# Patient Record
Sex: FEMALE | Race: WHITE | NOT HISPANIC OR LATINO | Employment: OTHER | URBAN - METROPOLITAN AREA
[De-identification: names, ages, dates, MRNs, and addresses within clinical notes are randomized per-mention and may not be internally consistent; named-entity substitution may affect disease eponyms.]

---

## 2023-03-16 LAB
THYROTROPIN (MIU/L) IN SER/PLAS BY DETECTION LIMIT <= 0.05 MIU/L: 1.85 MIU/L (ref 0.44–3.98)
THYROXINE (T4) FREE (NG/DL) IN SER/PLAS: 1.08 NG/DL (ref 0.78–1.48)

## 2023-03-20 LAB
THYROGLOBULIN AB (IU/ML) IN SER/PLAS: <0.9 IU/ML (ref 0–4)
THYROGLOBULIN LC-MS/MS: NORMAL NG/ML (ref 1.3–31.8)
THYROGLOBULIN: 3.4 NG/ML (ref 1.3–31.8)

## 2023-04-19 ENCOUNTER — OFFICE VISIT (OUTPATIENT)
Dept: PRIMARY CARE | Facility: CLINIC | Age: 38
End: 2023-04-19
Payer: COMMERCIAL

## 2023-04-19 VITALS
BODY MASS INDEX: 28.79 KG/M2 | TEMPERATURE: 97.7 F | HEART RATE: 72 BPM | SYSTOLIC BLOOD PRESSURE: 124 MMHG | DIASTOLIC BLOOD PRESSURE: 86 MMHG | WEIGHT: 190 LBS | OXYGEN SATURATION: 96 % | RESPIRATION RATE: 16 BRPM | HEIGHT: 68 IN

## 2023-04-19 DIAGNOSIS — J01.11 ACUTE RECURRENT FRONTAL SINUSITIS: Primary | ICD-10-CM

## 2023-04-19 DIAGNOSIS — F98.8 ATTENTION DEFICIT DISORDER (ADD) WITHOUT HYPERACTIVITY: ICD-10-CM

## 2023-04-19 PROCEDURE — 99214 OFFICE O/P EST MOD 30 MIN: CPT | Performed by: NURSE PRACTITIONER

## 2023-04-19 PROCEDURE — 1036F TOBACCO NON-USER: CPT | Performed by: NURSE PRACTITIONER

## 2023-04-19 RX ORDER — FLUTICASONE PROPIONATE AND SALMETEROL XINAFOATE 45; 21 UG/1; UG/1
1 AEROSOL, METERED RESPIRATORY (INHALATION)
COMMUNITY
End: 2023-10-11 | Stop reason: ALTCHOICE

## 2023-04-19 RX ORDER — DEXTROAMPHETAMINE SACCHARATE, AMPHETAMINE ASPARTATE MONOHYDRATE, DEXTROAMPHETAMINE SULFATE AND AMPHETAMINE SULFATE 2.5; 2.5; 2.5; 2.5 MG/1; MG/1; MG/1; MG/1
10 CAPSULE, EXTENDED RELEASE ORAL EVERY MORNING
Qty: 30 CAPSULE | Refills: 0 | Status: SHIPPED | OUTPATIENT
Start: 2023-04-19 | End: 2023-05-16

## 2023-04-19 RX ORDER — MINERAL OIL
180 ENEMA (ML) RECTAL DAILY
COMMUNITY

## 2023-04-19 RX ORDER — FLUTICASONE FUROATE 27.5 UG/1
2 SPRAY, METERED NASAL
COMMUNITY

## 2023-04-19 RX ORDER — AZITHROMYCIN 250 MG/1
TABLET, FILM COATED ORAL
Qty: 6 TABLET | Refills: 0 | Status: SHIPPED | OUTPATIENT
Start: 2023-04-19 | End: 2023-04-25 | Stop reason: SDUPTHER

## 2023-04-19 ASSESSMENT — PATIENT HEALTH QUESTIONNAIRE - PHQ9
1. LITTLE INTEREST OR PLEASURE IN DOING THINGS: NOT AT ALL
2. FEELING DOWN, DEPRESSED OR HOPELESS: NOT AT ALL
SUM OF ALL RESPONSES TO PHQ9 QUESTIONS 1 AND 2: 0

## 2023-04-19 ASSESSMENT — ENCOUNTER SYMPTOMS
SORE THROAT: 0
COUGH: 1
SINUS PAIN: 1

## 2023-04-19 ASSESSMENT — PAIN SCALES - GENERAL: PAINLEVEL: 0-NO PAIN

## 2023-04-19 NOTE — ASSESSMENT & PLAN NOTE
O[facility] reviewed. No recent purchases from dispensary.  Adderall XR 10 mg ordered. Can titrate up in the future if needed.  Drug screen ordered.  Controlled substance contract signed.   Follow up every 6 months.

## 2023-04-19 NOTE — PROGRESS NOTES
"Subjective   Patient ID: Lashaun Cruz is a 37 y.o. female who presents for Sinus Problem.    ADD  She had been on Adderall XR 20 mg for years. She stopped last year during pregnancy.   She does hold a medical marijuana card but did not use any marijuana for the past year. Confirmed on [employer]. Last purchase was May 2022. She was taking this at that time for back pain which has since resolved.  She would like to get back on the adderall given her attention deficit history. She is finding it hard to focus.     URI   This is a new problem. The current episode started 1 to 4 weeks ago. The problem has been gradually worsening. There has been no fever. Associated symptoms include congestion, coughing, a plugged ear sensation and sinus pain. Pertinent negatives include no ear pain or sore throat.        Review of Systems   HENT:  Positive for congestion and sinus pain. Negative for ear pain and sore throat.    Respiratory:  Positive for cough.      otherwise negative aside from what was mentioned above in HPI.    Objective   /86 (BP Location: Right arm, Patient Position: Sitting, BP Cuff Size: Adult)   Pulse 72   Temp 36.5 °C (97.7 °F) (Temporal)   Resp 16   Ht 1.727 m (5' 8\")   Wt 86.2 kg (190 lb)   SpO2 96%   BMI 28.89 kg/m²     Physical Exam  Vitals reviewed.   Constitutional:       Appearance: Normal appearance.   HENT:      Head: Normocephalic.      Right Ear: Tympanic membrane, ear canal and external ear normal.      Left Ear: Tympanic membrane, ear canal and external ear normal.      Nose: Nose normal.      Mouth/Throat:      Mouth: Mucous membranes are moist.   Eyes:      Conjunctiva/sclera: Conjunctivae normal.      Pupils: Pupils are equal, round, and reactive to light.   Cardiovascular:      Rate and Rhythm: Normal rate and regular rhythm.      Pulses: Normal pulses.      Heart sounds: Normal heart sounds.   Pulmonary:      Effort: Pulmonary effort is normal.      Breath sounds: Normal breath " sounds.   Abdominal:      General: Abdomen is flat. Bowel sounds are normal.      Palpations: Abdomen is soft.   Musculoskeletal:         General: Normal range of motion.      Cervical back: Normal range of motion and neck supple.   Skin:     General: Skin is warm.   Neurological:      General: No focal deficit present.      Mental Status: She is alert and oriented to person, place, and time. Mental status is at baseline.   Psychiatric:         Mood and Affect: Mood normal.         Behavior: Behavior normal.         Thought Content: Thought content normal.         Judgment: Judgment normal.         Assessment/Plan   Problem List Items Addressed This Visit          Other    Attention deficit disorder (ADD) without hyperactivity (Chronic)     O[facility] reviewed. No recent purchases from dispensary.  Adderall XR 10 mg ordered. Can titrate up in the future if needed.  Drug screen ordered.  Controlled substance contract signed.   Follow up every 6 months.         Relevant Medications    amphetamine-dextroamphetamine XR (Adderall XR) 10 mg 24 hr capsule     Other Visit Diagnoses       Acute recurrent frontal sinusitis    -  Primary    Relevant Medications    azithromycin (Zithromax) 250 mg tablet    Other Relevant Orders    Drug Screen, Urine With Reflex to Confirmation          Nasal rinses, humification/hot shower until mucous drains, increase fluid intake aiming for half your body weight in oz of water daily. This will help to thin mucous secretion and replace fluids that have been lost.  Warm, moist air, nasal saline hydration, avoidance of nasal irritants including cigarette smoke.  Sip hot or warm drinks, this may soothe nasal passages  Avoid extremely cold and dry air  Tylenol/Advil for muscle aches and fever.  Warm salt water gargles, throat lozenges and popsicle's for any sore throat.    Return in 1 week if not improving.

## 2023-04-24 ENCOUNTER — PATIENT MESSAGE (OUTPATIENT)
Dept: PRIMARY CARE | Facility: CLINIC | Age: 38
End: 2023-04-24

## 2023-04-24 DIAGNOSIS — J01.11 ACUTE RECURRENT FRONTAL SINUSITIS: ICD-10-CM

## 2023-04-25 RX ORDER — AZITHROMYCIN 250 MG/1
TABLET, FILM COATED ORAL
Qty: 6 TABLET | Refills: 0 | Status: SHIPPED | OUTPATIENT
Start: 2023-04-25 | End: 2023-04-30

## 2023-05-16 DIAGNOSIS — F90.0 ADHD, PREDOMINANTLY INATTENTIVE TYPE: Primary | ICD-10-CM

## 2023-05-16 PROBLEM — E05.90 SUBCLINICAL HYPERTHYROIDISM: Status: ACTIVE | Noted: 2023-05-16

## 2023-05-16 PROBLEM — B37.9 YEAST INFECTION: Status: ACTIVE | Noted: 2023-05-16

## 2023-05-16 PROBLEM — M25.512 LEFT SHOULDER PAIN: Status: ACTIVE | Noted: 2023-05-16

## 2023-05-16 PROBLEM — R09.81: Status: ACTIVE | Noted: 2023-05-16

## 2023-05-16 PROBLEM — R49.0 HOARSENESS OF VOICE: Status: ACTIVE | Noted: 2023-05-16

## 2023-05-16 PROBLEM — O46.90 VAGINAL BLEEDING IN PREGNANCY (HHS-HCC): Status: ACTIVE | Noted: 2023-05-16

## 2023-05-16 PROBLEM — J45.909 ASTHMA AFFECTING PREGNANCY, ANTEPARTUM (HHS-HCC): Status: ACTIVE | Noted: 2023-05-16

## 2023-05-16 PROBLEM — J38.3 DISORDER OF VOCAL CORD: Status: ACTIVE | Noted: 2023-05-16

## 2023-05-16 PROBLEM — O99.891: Status: ACTIVE | Noted: 2023-05-16

## 2023-05-16 PROBLEM — T84.84XA PAINFUL ORTHOPAEDIC HARDWARE (CMS-HCC): Status: ACTIVE | Noted: 2023-05-16

## 2023-05-16 PROBLEM — F12.90 LONG TERM CURRENT USE OF CANNABIS: Status: ACTIVE | Noted: 2023-05-16

## 2023-05-16 PROBLEM — J02.9 SORE THROAT: Status: ACTIVE | Noted: 2023-05-16

## 2023-05-16 PROBLEM — E55.9 VITAMIN D DEFICIENCY: Status: ACTIVE | Noted: 2023-05-16

## 2023-05-16 PROBLEM — J38.00 VOCAL CORD PARESIS: Status: ACTIVE | Noted: 2023-05-16

## 2023-05-16 PROBLEM — B97.7 HIGH RISK HPV INFECTION: Status: ACTIVE | Noted: 2023-05-16

## 2023-05-16 PROBLEM — O16.3 HYPERTENSION AFFECTING PREGNANCY IN THIRD TRIMESTER (HHS-HCC): Status: ACTIVE | Noted: 2023-05-16

## 2023-05-16 PROBLEM — N81.89 PELVIC FLOOR WEAKNESS: Status: ACTIVE | Noted: 2023-05-16

## 2023-05-16 PROBLEM — R30.9 PAINFUL URINATION: Status: ACTIVE | Noted: 2023-05-16

## 2023-05-16 PROBLEM — E04.2 MULTIPLE THYROID NODULES: Status: ACTIVE | Noted: 2023-05-16

## 2023-05-16 PROBLEM — O09.91: Status: ACTIVE | Noted: 2023-05-16

## 2023-05-16 PROBLEM — D80.3 SELECTIVE DEFICIENCY OF IGG (MULTI): Status: ACTIVE | Noted: 2023-05-16

## 2023-05-16 PROBLEM — R09.02 HYPOXIA: Status: ACTIVE | Noted: 2023-05-16

## 2023-05-16 PROBLEM — M62.81 GENERALIZED MUSCLE WEAKNESS: Status: ACTIVE | Noted: 2023-05-16

## 2023-05-16 PROBLEM — O99.519 ASTHMA AFFECTING PREGNANCY, ANTEPARTUM (HHS-HCC): Status: ACTIVE | Noted: 2023-05-16

## 2023-05-16 PROBLEM — M54.17 LUMBOSACRAL RADICULOPATHY AT L4: Status: ACTIVE | Noted: 2023-05-16

## 2023-05-16 PROBLEM — O99.513: Status: ACTIVE | Noted: 2023-05-16

## 2023-05-16 PROBLEM — R22.1 LOCALIZED SWELLING, MASS OR LUMP OF NECK: Status: ACTIVE | Noted: 2023-05-16

## 2023-05-16 PROBLEM — C73 PAPILLARY CARCINOMA OF THYROID (MULTI): Status: ACTIVE | Noted: 2023-05-16

## 2023-05-16 PROBLEM — G43.909 MIGRAINE HEADACHE: Status: ACTIVE | Noted: 2023-05-16

## 2023-05-16 RX ORDER — DEXTROAMPHETAMINE SULFATE, DEXTROAMPHETAMINE SACCHARATE, AMPHETAMINE SULFATE AND AMPHETAMINE ASPARTATE 5; 5; 5; 5 MG/1; MG/1; MG/1; MG/1
20 CAPSULE, EXTENDED RELEASE ORAL EVERY MORNING
Qty: 30 CAPSULE | Refills: 0 | Status: SHIPPED | OUTPATIENT
Start: 2023-05-16 | End: 2023-07-05 | Stop reason: SDUPTHER

## 2023-05-16 RX ORDER — DEXTROAMPHETAMINE SULFATE, DEXTROAMPHETAMINE SACCHARATE, AMPHETAMINE SULFATE AND AMPHETAMINE ASPARTATE 5; 5; 5; 5 MG/1; MG/1; MG/1; MG/1
20 CAPSULE, EXTENDED RELEASE ORAL EVERY MORNING
COMMUNITY
Start: 2022-05-22 | End: 2023-05-16 | Stop reason: SDUPTHER

## 2023-06-07 ENCOUNTER — TELEPHONE (OUTPATIENT)
Dept: PRIMARY CARE | Facility: CLINIC | Age: 38
End: 2023-06-07

## 2023-06-07 DIAGNOSIS — J02.9 SORE THROAT: Primary | ICD-10-CM

## 2023-06-07 RX ORDER — AZITHROMYCIN 250 MG/1
TABLET, FILM COATED ORAL
Qty: 6 TABLET | Refills: 0 | Status: SHIPPED | OUTPATIENT
Start: 2023-06-07 | End: 2023-06-12

## 2023-06-07 NOTE — TELEPHONE ENCOUNTER
Pt of Dr Powers. Stating she has a neg at home test for Covid. Has a cough and URI. Started an old zpak but wants to know if anything can be advised with us having no availability. Using [facility] in [place]. Please advise.

## 2023-07-05 DIAGNOSIS — F90.0 ADHD, PREDOMINANTLY INATTENTIVE TYPE: ICD-10-CM

## 2023-07-05 RX ORDER — DEXTROAMPHETAMINE SULFATE, DEXTROAMPHETAMINE SACCHARATE, AMPHETAMINE SULFATE AND AMPHETAMINE ASPARTATE 5; 5; 5; 5 MG/1; MG/1; MG/1; MG/1
20 CAPSULE, EXTENDED RELEASE ORAL EVERY MORNING
Qty: 30 CAPSULE | Refills: 0 | Status: SHIPPED | OUTPATIENT
Start: 2023-07-05 | End: 2023-08-02 | Stop reason: SDUPTHER

## 2023-08-02 DIAGNOSIS — F90.0 ADHD, PREDOMINANTLY INATTENTIVE TYPE: ICD-10-CM

## 2023-08-02 RX ORDER — DEXTROAMPHETAMINE SULFATE, DEXTROAMPHETAMINE SACCHARATE, AMPHETAMINE SULFATE AND AMPHETAMINE ASPARTATE 5; 5; 5; 5 MG/1; MG/1; MG/1; MG/1
20 CAPSULE, EXTENDED RELEASE ORAL EVERY MORNING
Qty: 30 CAPSULE | Refills: 0 | Status: SHIPPED | OUTPATIENT
Start: 2023-08-02 | End: 2023-08-31 | Stop reason: SDUPTHER

## 2023-08-31 DIAGNOSIS — F90.0 ADHD, PREDOMINANTLY INATTENTIVE TYPE: ICD-10-CM

## 2023-08-31 RX ORDER — DEXTROAMPHETAMINE SULFATE, DEXTROAMPHETAMINE SACCHARATE, AMPHETAMINE SULFATE AND AMPHETAMINE ASPARTATE 5; 5; 5; 5 MG/1; MG/1; MG/1; MG/1
20 CAPSULE, EXTENDED RELEASE ORAL EVERY MORNING
Qty: 30 CAPSULE | Refills: 0 | Status: SHIPPED | OUTPATIENT
Start: 2023-08-31 | End: 2023-10-03 | Stop reason: SDUPTHER

## 2023-10-03 DIAGNOSIS — F90.0 ADHD, PREDOMINANTLY INATTENTIVE TYPE: ICD-10-CM

## 2023-10-03 RX ORDER — DEXTROAMPHETAMINE SULFATE, DEXTROAMPHETAMINE SACCHARATE, AMPHETAMINE SULFATE AND AMPHETAMINE ASPARTATE 5; 5; 5; 5 MG/1; MG/1; MG/1; MG/1
20 CAPSULE, EXTENDED RELEASE ORAL EVERY MORNING
Qty: 30 CAPSULE | Refills: 0 | Status: SHIPPED | OUTPATIENT
Start: 2023-10-03 | End: 2023-10-25 | Stop reason: SDUPTHER

## 2023-10-04 DIAGNOSIS — G43.001 MIGRAINE WITHOUT AURA AND WITH STATUS MIGRAINOSUS, NOT INTRACTABLE: Primary | ICD-10-CM

## 2023-10-04 PROBLEM — R49.9 CHANGE IN VOICE: Status: ACTIVE | Noted: 2023-10-04

## 2023-10-04 PROBLEM — O98.519 COVID-19 AFFECTING PREGNANCY, ANTEPARTUM (HHS-HCC): Status: ACTIVE | Noted: 2023-10-04

## 2023-10-04 PROBLEM — R49.0 DYSPHONIA: Status: ACTIVE | Noted: 2023-10-04

## 2023-10-04 PROBLEM — O09.519 ADVANCED MATERNAL AGE, PRIMIGRAVIDA, ANTEPARTUM (HHS-HCC): Status: ACTIVE | Noted: 2023-10-04

## 2023-10-04 PROBLEM — J01.00 ACUTE MAXILLARY SINUSITIS: Status: ACTIVE | Noted: 2023-10-04

## 2023-10-04 PROBLEM — U07.1 COVID-19 AFFECTING PREGNANCY, ANTEPARTUM (HHS-HCC): Status: ACTIVE | Noted: 2023-10-04

## 2023-10-04 PROBLEM — S42.002D FRACTURE OF LEFT CLAVICLE WITH ROUTINE HEALING: Status: ACTIVE | Noted: 2023-10-04

## 2023-10-04 PROBLEM — R05.9 COUGHING: Status: ACTIVE | Noted: 2023-10-04

## 2023-10-04 PROBLEM — L30.1 DYSHIDROSIS (POMPHOLYX): Status: ACTIVE | Noted: 2017-04-17

## 2023-10-04 PROBLEM — M25.619 DECREASED RANGE OF MOTION OF SHOULDER: Status: ACTIVE | Noted: 2023-10-04

## 2023-10-05 RX ORDER — SUMATRIPTAN SUCCINATE 100 MG/1
100 TABLET ORAL ONCE AS NEEDED
COMMUNITY
End: 2023-10-05 | Stop reason: SDUPTHER

## 2023-10-05 RX ORDER — SUMATRIPTAN SUCCINATE 100 MG/1
100 TABLET ORAL ONCE AS NEEDED
Qty: 9 TABLET | Refills: 1 | Status: SHIPPED | OUTPATIENT
Start: 2023-10-05 | End: 2024-01-02 | Stop reason: SDUPTHER

## 2023-10-10 ENCOUNTER — LAB (OUTPATIENT)
Dept: LAB | Facility: LAB | Age: 38
End: 2023-10-10
Payer: COMMERCIAL

## 2023-10-10 DIAGNOSIS — C73 MALIGNANT NEOPLASM OF THYROID GLAND (MULTI): Primary | ICD-10-CM

## 2023-10-10 DIAGNOSIS — J01.11 ACUTE RECURRENT FRONTAL SINUSITIS: ICD-10-CM

## 2023-10-10 LAB
AMPHETAMINES UR QL SCN: ABNORMAL
BARBITURATES UR QL SCN: ABNORMAL
BENZODIAZ UR QL SCN: ABNORMAL
BZE UR QL SCN: ABNORMAL
CANNABINOIDS UR QL SCN: ABNORMAL
FENTANYL+NORFENTANYL UR QL SCN: ABNORMAL
OPIATES UR QL SCN: ABNORMAL
OXYCODONE+OXYMORPHONE UR QL SCN: ABNORMAL
PCP UR QL SCN: ABNORMAL
T4 FREE SERPL-MCNC: 0.92 NG/DL (ref 0.61–1.12)
TSH SERPL-ACNC: 1.81 MIU/L (ref 0.44–3.98)

## 2023-10-10 PROCEDURE — 84432 ASSAY OF THYROGLOBULIN: CPT

## 2023-10-10 PROCEDURE — 36415 COLL VENOUS BLD VENIPUNCTURE: CPT

## 2023-10-10 PROCEDURE — 84443 ASSAY THYROID STIM HORMONE: CPT

## 2023-10-10 PROCEDURE — 86800 THYROGLOBULIN ANTIBODY: CPT

## 2023-10-10 PROCEDURE — 84439 ASSAY OF FREE THYROXINE: CPT

## 2023-10-10 PROCEDURE — 80307 DRUG TEST PRSMV CHEM ANLYZR: CPT

## 2023-10-10 PROCEDURE — 80324 DRUG SCREEN AMPHETAMINES 1/2: CPT

## 2023-10-11 ENCOUNTER — OFFICE VISIT (OUTPATIENT)
Dept: ENDOCRINOLOGY | Facility: CLINIC | Age: 38
End: 2023-10-11
Payer: COMMERCIAL

## 2023-10-11 VITALS
BODY MASS INDEX: 27.77 KG/M2 | SYSTOLIC BLOOD PRESSURE: 106 MMHG | WEIGHT: 183.2 LBS | HEART RATE: 80 BPM | DIASTOLIC BLOOD PRESSURE: 66 MMHG | RESPIRATION RATE: 16 BRPM | HEIGHT: 68 IN

## 2023-10-11 DIAGNOSIS — C73 PAPILLARY CARCINOMA OF THYROID (MULTI): Primary | ICD-10-CM

## 2023-10-11 DIAGNOSIS — E04.2 MULTIPLE THYROID NODULES: ICD-10-CM

## 2023-10-11 PROBLEM — E05.90 SUBCLINICAL HYPERTHYROIDISM: Status: RESOLVED | Noted: 2023-05-16 | Resolved: 2023-10-11

## 2023-10-11 PROBLEM — E03.9 HYPOTHYROID: Status: ACTIVE | Noted: 2023-10-11

## 2023-10-11 PROBLEM — Z3A.39 39 WEEKS GESTATION OF PREGNANCY (HHS-HCC): Status: ACTIVE | Noted: 2023-04-26

## 2023-10-11 PROBLEM — Z79.899 LONG-TERM CURRENT USE OF STIMULANT: Status: ACTIVE | Noted: 2023-10-11

## 2023-10-11 PROBLEM — S39.94XA: Status: ACTIVE | Noted: 2023-04-26

## 2023-10-11 PROBLEM — M25.612 DECREASED RANGE OF MOTION OF LEFT SHOULDER: Status: ACTIVE | Noted: 2023-10-11

## 2023-10-11 PROBLEM — G89.29 CHRONIC PAIN: Status: ACTIVE | Noted: 2023-10-11

## 2023-10-11 PROBLEM — Z3A.38 38 WEEKS GESTATION OF PREGNANCY (HHS-HCC): Status: ACTIVE | Noted: 2023-10-11

## 2023-10-11 PROBLEM — Z34.00 PRIMIGRAVIDA, ANTEPARTUM (HHS-HCC): Status: ACTIVE | Noted: 2023-10-11

## 2023-10-11 PROCEDURE — 3078F DIAST BP <80 MM HG: CPT | Performed by: INTERNAL MEDICINE

## 2023-10-11 PROCEDURE — 1036F TOBACCO NON-USER: CPT | Performed by: INTERNAL MEDICINE

## 2023-10-11 PROCEDURE — 3074F SYST BP LT 130 MM HG: CPT | Performed by: INTERNAL MEDICINE

## 2023-10-11 PROCEDURE — 99213 OFFICE O/P EST LOW 20 MIN: CPT | Performed by: INTERNAL MEDICINE

## 2023-10-11 RX ORDER — LIDOCAINE 560 MG/1
PATCH PERCUTANEOUS; TOPICAL; TRANSDERMAL
COMMUNITY
Start: 2022-12-07 | End: 2023-10-25 | Stop reason: SDUPTHER

## 2023-10-11 RX ORDER — BENZONATATE 200 MG/1
1 CAPSULE ORAL
COMMUNITY
Start: 2022-12-01 | End: 2023-10-11 | Stop reason: ALTCHOICE

## 2023-10-11 RX ORDER — DOCUSATE SODIUM 100 MG/1
1 CAPSULE, LIQUID FILLED ORAL EVERY 12 HOURS
COMMUNITY
Start: 2022-04-06 | End: 2023-10-18 | Stop reason: ALTCHOICE

## 2023-10-11 RX ORDER — BENZONATATE 100 MG/1
1 CAPSULE ORAL 3 TIMES DAILY
COMMUNITY
Start: 2022-10-31 | End: 2023-10-11 | Stop reason: ALTCHOICE

## 2023-10-11 RX ORDER — DIPHENHYDRAMINE HCL 25 MG
25 TABLET ORAL
COMMUNITY
Start: 2022-10-25

## 2023-10-11 RX ORDER — GUAIFENESIN 100 MG/5ML
10 SOLUTION ORAL
COMMUNITY
Start: 2022-11-29 | End: 2023-10-11 | Stop reason: ALTCHOICE

## 2023-10-11 RX ORDER — BENZOCAINE .13; .15; .5; 2 G/100G; G/100G; G/100G; G/100G
1 GEL ORAL 2 TIMES DAILY
COMMUNITY
Start: 2022-07-21 | End: 2023-10-25 | Stop reason: SDUPTHER

## 2023-10-11 RX ORDER — ONDANSETRON 4 MG/1
1 TABLET, FILM COATED ORAL EVERY 8 HOURS PRN
COMMUNITY
Start: 2022-04-06 | End: 2023-10-25 | Stop reason: SDUPTHER

## 2023-10-11 RX ORDER — HYDROCORTISONE 25 MG/G
CREAM TOPICAL
COMMUNITY
Start: 2020-10-09

## 2023-10-11 RX ORDER — ACETAMINOPHEN 500 MG
TABLET ORAL
COMMUNITY
Start: 2022-02-01 | End: 2023-10-25 | Stop reason: SDUPTHER

## 2023-10-11 RX ORDER — B-COMPLEX WITH VITAMIN C
TABLET ORAL
COMMUNITY
Start: 2023-02-14

## 2023-10-11 RX ORDER — MOMETASONE FUROATE 1 MG/G
1 CREAM TOPICAL DAILY
COMMUNITY
Start: 2012-05-10 | End: 2023-10-25 | Stop reason: SDUPTHER

## 2023-10-11 RX ORDER — FLUTICASONE PROPIONATE AND SALMETEROL 250; 50 UG/1; UG/1
1 POWDER RESPIRATORY (INHALATION)
COMMUNITY
End: 2023-10-25 | Stop reason: SDUPTHER

## 2023-10-11 RX ORDER — AZELASTINE 1 MG/ML
1-2 SPRAY, METERED NASAL 2 TIMES DAILY
COMMUNITY
Start: 2022-11-29 | End: 2023-10-25 | Stop reason: SDUPTHER

## 2023-10-11 RX ORDER — ALBUTEROL SULFATE 0.63 MG/3ML
SOLUTION RESPIRATORY (INHALATION)
COMMUNITY
Start: 2022-12-13 | End: 2023-10-25 | Stop reason: SDUPTHER

## 2023-10-11 RX ORDER — FLUTICASONE PROPIONATE AND SALMETEROL 50; 250 UG/1; UG/1
1 POWDER RESPIRATORY (INHALATION) 2 TIMES DAILY
COMMUNITY
End: 2023-10-11 | Stop reason: ALTCHOICE

## 2023-10-11 RX ORDER — IPRATROPIUM BROMIDE AND ALBUTEROL SULFATE 2.5; .5 MG/3ML; MG/3ML
SOLUTION RESPIRATORY (INHALATION)
COMMUNITY
Start: 2022-12-07 | End: 2023-10-25 | Stop reason: SDUPTHER

## 2023-10-11 RX ORDER — FLUCONAZOLE 150 MG/1
1 TABLET ORAL
COMMUNITY
Start: 2023-03-15 | End: 2023-10-11 | Stop reason: ALTCHOICE

## 2023-10-11 RX ORDER — GUAIFENESIN AND PSEUDOEPHEDRINE HCL 1200; 120 MG/1; MG/1
1 TABLET, EXTENDED RELEASE ORAL 2 TIMES DAILY
COMMUNITY
Start: 2011-06-02 | End: 2023-10-11 | Stop reason: ALTCHOICE

## 2023-10-11 RX ORDER — METHYLPREDNISOLONE 4 MG/1
TABLET ORAL
COMMUNITY
Start: 2017-04-17 | End: 2023-10-11 | Stop reason: ALTCHOICE

## 2023-10-11 RX ORDER — NAPROXEN 500 MG/1
1 TABLET ORAL 2 TIMES DAILY
COMMUNITY
Start: 2022-05-21 | End: 2023-10-25 | Stop reason: SDUPTHER

## 2023-10-11 RX ORDER — ALBUTEROL SULFATE 90 UG/1
2 AEROSOL, METERED RESPIRATORY (INHALATION)
COMMUNITY
Start: 2022-11-10

## 2023-10-11 RX ORDER — METHOCARBAMOL 500 MG/1
1 TABLET, FILM COATED ORAL
COMMUNITY
Start: 2022-05-21 | End: 2023-10-25 | Stop reason: SDUPTHER

## 2023-10-11 RX ORDER — TRETINOIN 0.5 MG/G
CREAM TOPICAL NIGHTLY
COMMUNITY
Start: 2021-09-13

## 2023-10-11 RX ORDER — AZELASTINE HCL 205.5 UG/1
2 SPRAY NASAL 2 TIMES DAILY
COMMUNITY
Start: 2022-03-29 | End: 2023-10-25 | Stop reason: SDUPTHER

## 2023-10-11 RX ORDER — CYCLOBENZAPRINE HCL 5 MG
5 TABLET ORAL
COMMUNITY
Start: 2022-12-07 | End: 2023-10-25 | Stop reason: SDUPTHER

## 2023-10-11 RX ORDER — ASPIRIN 81 MG/1
1 TABLET ORAL DAILY
COMMUNITY
Start: 2023-02-14 | End: 2023-10-25 | Stop reason: SDUPTHER

## 2023-10-11 ASSESSMENT — ENCOUNTER SYMPTOMS
COUGH: 0
HEADACHES: 0
CHILLS: 0
NAUSEA: 0
PALPITATIONS: 0
FEVER: 0
DIARRHEA: 0
VOMITING: 0
FATIGUE: 0
SHORTNESS OF BREATH: 0

## 2023-10-11 NOTE — PROGRESS NOTES
"Subjective   Patient ID: Lashaun Cruz is a 38 y.o. female who presents for Thyroid Cancer.  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  Now 8 months old.  Still breastfeeding.  Feels well.   No complaints.  Hopes to work harder on weight when baby weaned  Thyroid ultrasound 7/2023: stable small nodules in remant      Review of Systems   Constitutional:  Negative for chills, fatigue and fever.   Respiratory:  Negative for cough and shortness of breath.    Cardiovascular:  Negative for chest pain and palpitations.   Gastrointestinal:  Negative for diarrhea, nausea and vomiting.   Neurological:  Negative for headaches.       Objective    10/11/2023 11:13 AM   /66   Pulse 80   Resp 16   Weight 83.1 kg (183 lb 3.2 oz)   Height 1.727 m (5' 8\")       Physical Exam  Constitutional:       Appearance: Normal appearance. She is overweight.   HENT:      Head: Normocephalic and atraumatic.   Neck:      Thyroid: No thyroid mass, thyromegaly or thyroid tenderness.      Comments: Thyroidectomy scar, residual thyroid with no palpable nodules  Cardiovascular:      Rate and Rhythm: Normal rate and regular rhythm.      Heart sounds: No murmur heard.     No gallop.   Pulmonary:      Effort: Pulmonary effort is normal.      Breath sounds: Normal breath sounds.   Abdominal:      Palpations: Abdomen is soft.      Comments: benign   Neurological:      General: No focal deficit present.      Mental Status: She is alert and oriented to person, place, and time.      Deep Tendon Reflexes: Reflexes are normal and symmetric.   Psychiatric:         Behavior: Behavior is cooperative.         Assessment/Plan   Problem List Items Addressed This Visit             ICD-10-CM    Papillary carcinoma of thyroid (CMS/HCC) - Primary C73    Relevant Orders    Thyroid Stimulating Hormone    Thyroxine, Free    Thyroglobulin and Antithyroglobulin    Multiple " thyroid nodules E04.2   Discussed course.  Ultrasound in July looked good and tg has been low.  This appts tg pending.  Tsh normal.  Discussed follow up next spring/summer and repeat ultrasound and labs then follow up yearly.  Encouraged her to call with concerns

## 2023-10-12 NOTE — PATIENT INSTRUCTIONS
-continue current thyroid med dose  -we will call with Thyroglobulin results  -follow up in 6 months  Call with concerns or questions

## 2023-10-13 LAB
AMPHET UR-MCNC: 3430 NG/ML
BILL ONLY-THYROGLOBULIN: NORMAL
MDA UR-MCNC: <200 NG/ML
MDEA UR-MCNC: <200 NG/ML
MDMA UR-MCNC: <200 NG/ML
METHAMPHET UR-MCNC: <200 NG/ML
PHENTERMINE UR CFM-MCNC: <200 NG/ML
THYROGLOB AB SERPL-ACNC: <0.9 IU/ML (ref 0–4)
THYROGLOB SERPL-MCNC: 3.4 NG/ML (ref 1.3–31.8)
THYROGLOB SERPL-MCNC: NORMAL NG/ML (ref 1.3–31.8)

## 2023-10-18 ENCOUNTER — ANCILLARY PROCEDURE (OUTPATIENT)
Dept: RADIOLOGY | Facility: CLINIC | Age: 38
End: 2023-10-18
Payer: COMMERCIAL

## 2023-10-18 ENCOUNTER — OFFICE VISIT (OUTPATIENT)
Dept: ORTHOPEDIC SURGERY | Facility: CLINIC | Age: 38
End: 2023-10-18
Payer: COMMERCIAL

## 2023-10-18 DIAGNOSIS — M77.8 TENDINITIS OF LEFT WRIST: Primary | ICD-10-CM

## 2023-10-18 DIAGNOSIS — S69.92XA WRIST INJURY, LEFT, INITIAL ENCOUNTER: ICD-10-CM

## 2023-10-18 PROCEDURE — 1036F TOBACCO NON-USER: CPT | Performed by: ORTHOPAEDIC SURGERY

## 2023-10-18 PROCEDURE — 99214 OFFICE O/P EST MOD 30 MIN: CPT | Performed by: ORTHOPAEDIC SURGERY

## 2023-10-18 PROCEDURE — 3074F SYST BP LT 130 MM HG: CPT | Performed by: ORTHOPAEDIC SURGERY

## 2023-10-18 PROCEDURE — 73110 X-RAY EXAM OF WRIST: CPT | Mod: LEFT SIDE | Performed by: RADIOLOGY

## 2023-10-18 PROCEDURE — 3078F DIAST BP <80 MM HG: CPT | Performed by: ORTHOPAEDIC SURGERY

## 2023-10-18 PROCEDURE — 73110 X-RAY EXAM OF WRIST: CPT | Mod: LT,FY

## 2023-10-18 NOTE — LETTER
November 11, 2023     Jackie Powers MD  [address]  [facility]  [facility] [address]    Patient: Lashaun Cruz   YOB: 1985   Date of Visit: 10/18/2023       Dear Dr. Jackie Powers MD:    Thank you for referring Lashaun Cruz to me for evaluation. Below are my notes for this consultation.  If you have questions, please do not hesitate to call me. I look forward to following your patient along with you.       Sincerely,     Rodolfo Shelley MD      CC: No Recipients  ______________________________________________________________________________________    CHIEF COMPLAINT         Left wrist pain    ASSESSMENT + PLAN    Left wrist ECU tendinitis    I reviewed the nature of the condition and the expected time course of symptoms.  We discussed options for management and agreed on use of Tylenol or anti-inflammatories along with ice or heat as needed.  Consider a compressive strap during athletic or other forceful hand activities.  Advance activity as symptoms allow, with no particular restrictions.    Follow-up with any additional concerns.        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  This began about a month ago after a day at [employer].  No single specific recall direct trauma to that wrist.  The pain is in the dorsal ulnar portion of the wrist and worse with twisting or lifting with the left arm.  No similar problem on the right.  No popping, clicking, or instability.  No pain with pure finger motion.  No night pain or rest pain, just activity pain.    She is not diabetic.  She does not smoke.      REVIEW OF SYSTEMS       A 30-item multi-system Review Of Systems was obtained on today's intake form.  This was reviewed with the patient and is correct.  The pertinent positives and negatives are listed above.  The form has been scanned separately into the  medical record.      PHYSICAL EXAM    Constitutional:    Appears stated age. Well-developed and well-nourished female in no acute distress.  Psychiatric:         Pleasant normal mood and affect. Behavior is appropriate for the situation.   Head:                   Normocephalic and atraumatic.  Eyes:                    Pupils are equal and round.  Cardiovascular:  2+ radial and ulnar pulses. Fingers well-perfused.  Respiratory:        Effort normal. No respiratory distress. Speaking in complete sentences.  Neurologic:       Alert and oriented to person, place, and time.  Skin:                Skin is intact, warm and dry.  Hematologic / Lymphatic:    No lymphedema or lymphangitis.    Extremities / Musculoskeletal:                      Skin of the left hand and wrist is intact with no erythema, ecchymosis, or diffuse swelling.  Normal skin drag and coloration.  Full composite finger flexion extension with full intrinsic plus minus posture.  Good sagittal plane balance.  Tendons are intact by individual testing.  Symmetric wrist and forearm motion.  Negative Vazquez.  Negative midcarpal shift.  Negative PT compression and LT shear tests.  Tender over the ECU.  Positive Synergy test.  Tendon stable in the groove by FUSS test.  Sensation intact to light touch in all distributions.  Capillary refill less than 2 seconds.      IMAGING / LABS / EMGs           X-rays left wrist ordered and independently interpreted by me today show no acute fracture, subluxation, or foreign body.  Joint spaces are concentric and well preserved.      Past Medical History:   Diagnosis Date   • 13 weeks gestation of pregnancy 08/25/2022    13 weeks gestation of pregnancy   • 20 weeks gestation of pregnancy 09/29/2022    20 weeks gestation of pregnancy   • 24 weeks gestation of pregnancy 10/25/2022    24 weeks gestation of pregnancy   • 27 weeks gestation of pregnancy 11/10/2022    27 weeks gestation of pregnancy   • 9 weeks gestation of pregnancy  07/21/2022    9 weeks gestation of pregnancy   • Acute candidiasis of vulva and vagina 01/19/2017    Vaginal candidiasis   • Acute candidiasis of vulva and vagina 11/25/2019    Vaginal yeast infection   • Acute maxillary sinusitis, unspecified 11/05/2022    Sinusitis, acute, maxillary   • Acute recurrent maxillary sinusitis 03/18/2021    Acute recurrent maxillary sinusitis   • Acute vaginitis 09/28/2020    Bacterial vaginitis   • Bronchitis, not specified as acute or chronic 02/26/2013    Tracheobronchitis   • Cervical disc disorder, unspecified, unspecified cervical region 06/30/2020    Disorder of intervertebral disc of cervical spine   • Cutaneous abscess of unspecified foot 04/25/2014    Toe abscess   • Dermatitis, unspecified 04/13/2017    Eczema of left hand   • Displaced fracture of shaft of left clavicle, initial encounter for closed fracture 10/11/2021    Closed displaced fracture of shaft of left clavicle, initial encounter   • Dyshidrosis (pompholyx) 04/08/2019    Dyshidrosis   • Encounter for therapeutic drug level monitoring 08/06/2018    Therapeutic drug monitoring   • 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   • Fever, unspecified 11/29/2016    Fever with chills   • Iliotibial band syndrome, right leg 09/11/2019    Iliotibial band syndrome of right side   • Local infection of the skin and subcutaneous tissue, unspecified 04/25/2014    Toe infection   • Localized swelling, mass and lump, head 10/14/2021    Localized swelling, mass and lump, head   • Otalgia, right ear 02/01/2017    Chronic right ear pain   • Other chest pain 01/15/2018    Chest pain, localized   • Other conditions influencing health status     Reported Positive Pap Smear   • Other conditions influencing health status 06/24/2020    Radicular pain of right lower back   • Other conditions influencing health status 01/30/2020    History of petechial rash   • Other conditions influencing health status 05/20/2022    Seroma, postoperative    • Other intervertebral disc displacement, lumbar region 10/20/2021    Lumbar disc herniation   • Other long term (current) drug therapy 10/27/2021    Encounter for long-term current use of high risk medication   • Other specified noninflammatory disorders of vagina 03/23/2020    Vaginal odor   • Other specified noninflammatory disorders of vagina 10/12/2020    Vaginal irritation   • Other specified symptoms and signs involving the circulatory and respiratory systems 10/11/2016    Globus sensation   • Other sprain of right hip, initial encounter 05/05/2020    Tear of right acetabular labrum, initial encounter   • Other symptoms and signs involving the musculoskeletal system 09/27/2021    Weakness of right hip   • Pain in left finger(s) 03/20/2018    Thumb pain, left   • Pain in right hip 06/18/2020    Chronic pain of right hip   • Pain in right hip 01/26/2021    Hip pain, right   • Pain in unspecified knee     Joint pain, knee   • Pelvic and perineal pain 03/23/2020    Pain, pelvic, female   • Personal history of diseases of the skin and subcutaneous tissue 11/14/2019    History of pustular acne   • Personal history of other benign neoplasm 01/26/2021    History of other benign neoplasm   • Personal history of other diseases of the digestive system     History of hemorrhoids   • Personal history of other diseases of the female genital tract 10/03/2018    History of dyspareunia in female   • Personal history of other diseases of the female genital tract 08/05/2018    History of irregular menstrual cycles   • Personal history of other diseases of the female genital tract 06/30/2020    History of dysfunctional uterine bleeding   • Personal history of other diseases of the female genital tract     History of dyspareunia in female   • Personal history of other diseases of the musculoskeletal system and connective tissue 06/25/2020    History of low back pain   • Personal history of other diseases of the musculoskeletal  system and connective tissue 08/14/2013    History of low back pain   • Personal history of other diseases of the respiratory system 11/26/2021    History of sore throat   • Personal history of other diseases of the respiratory system 04/13/2017    History of acute bronchitis   • Personal history of other diseases of the respiratory system 06/07/2019    History of acute bronchitis with bronchospasm   • Personal history of other diseases of the respiratory system 02/10/2020    History of sinusitis   • Personal history of other diseases of the respiratory system 06/11/2017    History of chronic sinusitis   • Personal history of other diseases of the respiratory system 03/29/2022    History of paranasal sinus congestion   • Personal history of other diseases of the respiratory system 07/14/2016    History of acute sinusitis   • Personal history of other diseases of the respiratory system 10/17/2022    History of sore throat   • Personal history of other endocrine, nutritional and metabolic disease 03/23/2020    History of thyroid nodule   • Personal history of other endocrine, nutritional and metabolic disease 03/09/2020    History of hyperthyroidism   • Personal history of other infectious and parasitic diseases 04/08/2019    History of traveler's diarrhea   • Personal history of other specified conditions 03/23/2020    History of abdominal pain   • Personal history of other specified conditions 12/28/2020    History of nipple discharge   • Personal history of other specified conditions 01/14/2020    History of lump of right breast   • Personal history of other specified conditions 10/01/2019    History of abnormal mammogram   • Personal history of other specified conditions 08/29/2022    History of hoarseness   • Personal history of other specified conditions 09/07/2021    History of dysphagia   • Rash and other nonspecific skin eruption 01/07/2019    Rash   • Rash and other nonspecific skin eruption 02/09/2021     Rash   • Right upper quadrant pain 12/23/2017    Colicky right upper quadrant pain   • Segmental and somatic dysfunction of pelvic region 01/30/2020    Segmental and somatic dysfunction of pelvic region   • Sprain of metacarpophalangeal joint of left thumb, initial encounter 03/25/2018    Sprain of metacarpophalangeal (MCP) joint of left thumb, initial encounter   • Strain of muscle, fascia and tendon at neck level, initial encounter 03/09/2021    Strain of neck muscle, initial encounter   • Strain of muscle, fascia and tendon of right hip, initial encounter 05/27/2020    Tear of right gluteus medius tendon   • Thyrotoxicosis, unspecified without thyrotoxic crisis or storm 08/25/2022    Subclinical hyperthyroidism   • Trochanteric bursitis, right hip 05/27/2020    Trochanteric bursitis of right hip   • Unspecified fall, initial encounter 06/30/2020    Fall with injury   • Unspecified injury of left wrist, hand and finger(s), subsequent encounter 05/08/2018    Injury of left thumb, subsequent encounter   • Unspecified nonsuppurative otitis media, bilateral 03/18/2021    Bilateral otitis media with effusion   • Unspecified symptoms and signs involving the genitourinary system 01/30/2022    UTI symptoms   • Urinary tract infection, site not specified 08/19/2021    Acute UTI       Medication Documentation Review Audit       Reviewed by Rodolfo Shelley MD (Physician) on 11/11/23 at 1755      Medication Order Taking? Sig Documenting Provider Last Dose Status   Adderall XR 20 mg 24 hr capsule [id number]  Take 1 capsule (20 mg) by mouth once daily in the morning. Lucie Beavers, APRN-CNP  Active   albuterol 90 mcg/actuation inhaler [id number] No Inhale 2 puffs. EVERY 4-6 HOURS AS NEEDED Historical Provider, MD Taking Active   diphenhydrAMINE (Benadryl Allergy) 25 mg tablet [id number] No Take 1 tablet (25 mg) by mouth. Historical Provider, MD Taking Active   fexofenadine (Allegra) 180 mg tablet [id number] No Take 1 tablet (180  mg) by mouth once daily. Historical Provider, MD Taking Active   fluticasone (Flonase Sensimist) 27.5 mcg/actuation nasal spray [id number] No Administer 2 sprays into each nostril once daily. Historical Provider, MD Taking Active   fluticasone propion-salmeteroL (Advair Diskus) 250-50 mcg/dose diskus inhaler [id number]  Inhale 1 puff 2 times a day. Jackie Powers MD  Active   hydrocortisone 2.5 % cream [id number] No Apply topically. Historical Provider, MD Taking Active   PRENATAL 2-IRON-FOLIC ACID-OM3 ORAL [id number] No Take by mouth. 1 daily Historical Provider, MD Taking Active   prenatal vitamin, iron-folic, (Prenatal Vitamin) 27 mg iron-800 mcg folic acid tablet [id number] No Take by mouth. Historical Provider, MD Taking Active   SUMAtriptan (Imitrex) 100 mg tablet [id number] No Take 1 tablet (100 mg) by mouth 1 time if needed for migraine. Jackie Powers MD Taking Active   tretinoin (Retin-A) 0.05 % cream [id number] No Apply topically once daily at bedtime. Historical Provider, MD Taking Active                    Allergies   Allergen Reactions   • Adhesive Tape-Silicones Rash     paper ok.adhesive and bandaids cause skin reaction   • Amoxicillin-Pot Clavulanate Rash   • Cefdinir Rash   • Ciprofloxacin Rash   • House Dust Rash   • Levofloxacin Rash   • Penicillins Rash       Social History     Socioeconomic History   • Marital status: Single     Spouse name: Not on file   • Number of children: Not on file   • Years of education: Not on file   • Highest education level: Not on file   Occupational History   • Not on file   Tobacco Use   • Smoking status: Never   • Smokeless tobacco: Never   Vaping Use   • Vaping Use: Never used   Substance and Sexual Activity   • Alcohol use: Never   • Drug use: Never   • Sexual activity: Not on file   Other Topics Concern   • Not on file   Social History Narrative   • Not on file     Social Determinants of Health     Financial Resource Strain: Not on file   Food Insecurity: Not on  file   Transportation Needs: Not on file   Physical Activity: Not on file   Stress: Not on file   Social Connections: Not on file   Intimate Partner Violence: Not on file   Housing Stability: Not on file       Past Surgical History:   Procedure Laterality Date   • OTHER SURGICAL HISTORY  10/07/2016    Dental Surgery   • OTHER SURGICAL HISTORY  09/20/2019    Willard tooth extraction   • OTHER SURGICAL HISTORY  09/20/2019    Varicose vein ligation   • OTHER SURGICAL HISTORY  01/11/2014    Vaginal Pap smear   • SINUS SURGERY  10/07/2016    Sinus Surgery         Electronically Signed      LENORA Shelley MD      Orthopaedic Hand Surgery      [phone]

## 2023-10-19 ENCOUNTER — APPOINTMENT (OUTPATIENT)
Dept: PRIMARY CARE | Facility: CLINIC | Age: 38
End: 2023-10-19

## 2023-10-25 ENCOUNTER — OFFICE VISIT (OUTPATIENT)
Dept: PRIMARY CARE | Facility: CLINIC | Age: 38
End: 2023-10-25
Payer: COMMERCIAL

## 2023-10-25 VITALS
OXYGEN SATURATION: 95 % | WEIGHT: 179.8 LBS | DIASTOLIC BLOOD PRESSURE: 77 MMHG | BODY MASS INDEX: 27.25 KG/M2 | SYSTOLIC BLOOD PRESSURE: 116 MMHG | HEIGHT: 68 IN | TEMPERATURE: 97.3 F | HEART RATE: 72 BPM

## 2023-10-25 DIAGNOSIS — F90.0 ADHD, PREDOMINANTLY INATTENTIVE TYPE: ICD-10-CM

## 2023-10-25 PROCEDURE — 3078F DIAST BP <80 MM HG: CPT | Performed by: NURSE PRACTITIONER

## 2023-10-25 PROCEDURE — 99214 OFFICE O/P EST MOD 30 MIN: CPT | Performed by: NURSE PRACTITIONER

## 2023-10-25 PROCEDURE — 1036F TOBACCO NON-USER: CPT | Performed by: NURSE PRACTITIONER

## 2023-10-25 PROCEDURE — 3074F SYST BP LT 130 MM HG: CPT | Performed by: NURSE PRACTITIONER

## 2023-10-25 RX ORDER — DEXTROAMPHETAMINE SULFATE, DEXTROAMPHETAMINE SACCHARATE, AMPHETAMINE SULFATE AND AMPHETAMINE ASPARTATE 5; 5; 5; 5 MG/1; MG/1; MG/1; MG/1
20 CAPSULE, EXTENDED RELEASE ORAL EVERY MORNING
Qty: 30 CAPSULE | Refills: 0 | Status: SHIPPED | OUTPATIENT
Start: 2023-10-25 | End: 2023-12-06 | Stop reason: SDUPTHER

## 2023-10-25 ASSESSMENT — PATIENT HEALTH QUESTIONNAIRE - PHQ9
2. FEELING DOWN, DEPRESSED OR HOPELESS: NOT AT ALL
1. LITTLE INTEREST OR PLEASURE IN DOING THINGS: NOT AT ALL
SUM OF ALL RESPONSES TO PHQ9 QUESTIONS 1 AND 2: 0

## 2023-10-25 NOTE — PROGRESS NOTES
Subjective   Patient ID: Lashaun Cruz is a 38 y.o. female who presents for Follow-up (On medication. ).    OARRS:  No data recorded  I have personally reviewed the OARRS report for Lashaun Cruz. I have considered the risks of abuse, dependence, addiction and diversion    Is the patient prescribed a combination of a benzodiazepine and opioid?  No    Last Urine Drug Screen / ordered today: Yes  Recent Results (from the past 8760 hour(s))  -Amphetamine Confirm, Urine:   Collection Time: 10/10/23  2:39 PM       Result                      Value             Ref Range           Methamphetamine Quant,*     <200              ng/mL               MDA, Urine                  <200              ng/mL               MDEA, Urine                 <200              ng/mL               Phentermine,Urine           <200              ng/mL               Amphetamines,Urine          3430              ng/mL               MDMA, Urine                 <200              ng/mL          -Drug Screen, Urine With Reflex to Confirmation:   Collection Time: 10/10/23  2:39 PM       Result                      Value             Ref Range           Amphetamine Screen, Ur*                       Presumptive *   Presumptive Positive (A)       Barbiturate Screen, Ur*                       Presumptive *   Presumptive Negative       Benzodiazepines Screen*                       Presumptive *   Presumptive Negative       Cannabinoid Screen, Ur*                       Presumptive *   Presumptive Negative       Cocaine Metabolite Scr*                       Presumptive *   Presumptive Negative       Fentanyl Screen, Urine                        Presumptive *   Presumptive Negative       Opiate Screen, Urine                          Presumptive *   Presumptive Negative       Oxycodone Screen, Urine                       Presumptive *   Presumptive Negative       PCP Screen, Urine                             Presumptive *   Presumptive Negative  Results are  "as expected.         Controlled Substance Agreement:  Date of the Last Agreement: 4/2023  Reviewed Controlled Substance Agreement including but not limited to the benefits, risks, and alternatives to treatment with a Controlled Substance medication(s).    Stimulants:   What is the patient's goal of therapy? Improved concentration  Is this being achieved with current treatment? yes    Activities of Daily Living:   Is your overall impression that this patient is benefiting (symptom reduction outweighs side effects) from stimulant therapy? Yes     1. Physical Functioning: Better  2. Family Relationship: Better  3. Social Relationship: Better  4. Mood: Better  5. Sleep Patterns: Better  6. Overall Function: Better                 Review of Systems  otherwise negative aside from what was mentioned above in HPI.    Objective   /77   Pulse 72   Temp 36.3 °C (97.3 °F)   Ht 1.727 m (5' 8\")   Wt 81.6 kg (179 lb 12.8 oz)   SpO2 95%   BMI 27.34 kg/m²     Physical Exam  Constitutional:       Appearance: Normal appearance.   Cardiovascular:      Rate and Rhythm: Normal rate and regular rhythm.   Pulmonary:      Effort: Pulmonary effort is normal.      Breath sounds: Normal breath sounds.   Abdominal:      General: Abdomen is flat.   Neurological:      General: No focal deficit present.      Mental Status: She is alert and oriented to person, place, and time. Mental status is at baseline.   Psychiatric:         Mood and Affect: Mood normal.         Behavior: Behavior normal.         Thought Content: Thought content normal.         Judgment: Judgment normal.         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 No red flags.  CSA and UDS UTD         Relevant Medications    Adderall XR 20 mg 24 hr capsule          "

## 2023-10-30 DIAGNOSIS — D80.3 SELECTIVE DEFICIENCY OF IGG (MULTI): Primary | ICD-10-CM

## 2023-10-30 DIAGNOSIS — J45.20 MILD INTERMITTENT ASTHMA WITHOUT COMPLICATION (HHS-HCC): ICD-10-CM

## 2023-10-30 RX ORDER — FLUTICASONE PROPIONATE AND SALMETEROL 250; 50 UG/1; UG/1
1 POWDER RESPIRATORY (INHALATION)
Qty: 60 EACH | Refills: 3 | Status: SHIPPED | OUTPATIENT
Start: 2023-10-30 | End: 2024-04-25 | Stop reason: SDUPTHER

## 2023-11-11 NOTE — PROGRESS NOTES
CHIEF COMPLAINT         Left wrist pain    ASSESSMENT + PLAN    Left wrist ECU tendinitis    I reviewed the nature of the condition and the expected time course of symptoms.  We discussed options for management and agreed on use of Tylenol or anti-inflammatories along with ice or heat as needed.  Consider a compressive strap during athletic or other forceful hand activities.  Advance activity as symptoms allow, with no particular restrictions.    Follow-up with any additional concerns.        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  This began about a month ago after a day at [employer].  No single specific recall direct trauma to that wrist.  The pain is in the dorsal ulnar portion of the wrist and worse with twisting or lifting with the left arm.  No similar problem on the right.  No popping, clicking, or instability.  No pain with pure finger motion.  No night pain or rest pain, just activity pain.    She is not diabetic.  She does not smoke.      REVIEW OF SYSTEMS       A 30-item multi-system Review Of Systems was obtained on today's intake form.  This was reviewed with the patient and is correct.  The pertinent positives and negatives are listed above.  The form has been scanned separately into the medical record.      PHYSICAL EXAM    Constitutional:    Appears stated age. Well-developed and well-nourished female in no acute distress.  Psychiatric:         Pleasant normal mood and affect. Behavior is appropriate for the situation.   Head:                   Normocephalic and atraumatic.  Eyes:                    Pupils are equal and round.  Cardiovascular:  2+ radial and ulnar pulses. Fingers well-perfused.  Respiratory:        Effort normal. No respiratory distress. Speaking in complete sentences.  Neurologic:       Alert and oriented to person, place, and time.  Skin:                Skin is intact,  warm and dry.  Hematologic / Lymphatic:    No lymphedema or lymphangitis.    Extremities / Musculoskeletal:                      Skin of the left hand and wrist is intact with no erythema, ecchymosis, or diffuse swelling.  Normal skin drag and coloration.  Full composite finger flexion extension with full intrinsic plus minus posture.  Good sagittal plane balance.  Tendons are intact by individual testing.  Symmetric wrist and forearm motion.  Negative Vazquez.  Negative midcarpal shift.  Negative PT compression and LT shear tests.  Tender over the ECU.  Positive Synergy test.  Tendon stable in the groove by FUSS test.  Sensation intact to light touch in all distributions.  Capillary refill less than 2 seconds.      IMAGING / LABS / EMGs           X-rays left wrist ordered and independently interpreted by me today show no acute fracture, subluxation, or foreign body.  Joint spaces are concentric and well preserved.      Past Medical History:   Diagnosis Date    13 weeks gestation of pregnancy 08/25/2022    13 weeks gestation of pregnancy    20 weeks gestation of pregnancy 09/29/2022    20 weeks gestation of pregnancy    24 weeks gestation of pregnancy 10/25/2022    24 weeks gestation of pregnancy    27 weeks gestation of pregnancy 11/10/2022    27 weeks gestation of pregnancy    9 weeks gestation of pregnancy 07/21/2022    9 weeks gestation of pregnancy    Acute candidiasis of vulva and vagina 01/19/2017    Vaginal candidiasis    Acute candidiasis of vulva and vagina 11/25/2019    Vaginal yeast infection    Acute maxillary sinusitis, unspecified 11/05/2022    Sinusitis, acute, maxillary    Acute recurrent maxillary sinusitis 03/18/2021    Acute recurrent maxillary sinusitis    Acute vaginitis 09/28/2020    Bacterial vaginitis    Bronchitis, not specified as acute or chronic 02/26/2013    Tracheobronchitis    Cervical disc disorder, unspecified, unspecified cervical region 06/30/2020    Disorder of intervertebral disc  of cervical spine    Cutaneous abscess of unspecified foot 04/25/2014    Toe abscess    Dermatitis, unspecified 04/13/2017    Eczema of left hand    Displaced fracture of shaft of left clavicle, initial encounter for closed fracture 10/11/2021    Closed displaced fracture of shaft of left clavicle, initial encounter    Dyshidrosis (pompholyx) 04/08/2019    Dyshidrosis    Encounter for therapeutic drug level monitoring 08/06/2018    Therapeutic drug monitoring    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    Fever, unspecified 11/29/2016    Fever with chills    Iliotibial band syndrome, right leg 09/11/2019    Iliotibial band syndrome of right side    Local infection of the skin and subcutaneous tissue, unspecified 04/25/2014    Toe infection    Localized swelling, mass and lump, head 10/14/2021    Localized swelling, mass and lump, head    Otalgia, right ear 02/01/2017    Chronic right ear pain    Other chest pain 01/15/2018    Chest pain, localized    Other conditions influencing health status     Reported Positive Pap Smear    Other conditions influencing health status 06/24/2020    Radicular pain of right lower back    Other conditions influencing health status 01/30/2020    History of petechial rash    Other conditions influencing health status 05/20/2022    Seroma, postoperative    Other intervertebral disc displacement, lumbar region 10/20/2021    Lumbar disc herniation    Other long term (current) drug therapy 10/27/2021    Encounter for long-term current use of high risk medication    Other specified noninflammatory disorders of vagina 03/23/2020    Vaginal odor    Other specified noninflammatory disorders of vagina 10/12/2020    Vaginal irritation    Other specified symptoms and signs involving the circulatory and respiratory systems 10/11/2016    Globus sensation    Other sprain of right hip, initial encounter 05/05/2020    Tear of right acetabular labrum, initial encounter    Other symptoms and signs  involving the musculoskeletal system 09/27/2021    Weakness of right hip    Pain in left finger(s) 03/20/2018    Thumb pain, left    Pain in right hip 06/18/2020    Chronic pain of right hip    Pain in right hip 01/26/2021    Hip pain, right    Pain in unspecified knee     Joint pain, knee    Pelvic and perineal pain 03/23/2020    Pain, pelvic, female    Personal history of diseases of the skin and subcutaneous tissue 11/14/2019    History of pustular acne    Personal history of other benign neoplasm 01/26/2021    History of other benign neoplasm    Personal history of other diseases of the digestive system     History of hemorrhoids    Personal history of other diseases of the female genital tract 10/03/2018    History of dyspareunia in female    Personal history of other diseases of the female genital tract 08/05/2018    History of irregular menstrual cycles    Personal history of other diseases of the female genital tract 06/30/2020    History of dysfunctional uterine bleeding    Personal history of other diseases of the female genital tract     History of dyspareunia in female    Personal history of other diseases of the musculoskeletal system and connective tissue 06/25/2020    History of low back pain    Personal history of other diseases of the musculoskeletal system and connective tissue 08/14/2013    History of low back pain    Personal history of other diseases of the respiratory system 11/26/2021    History of sore throat    Personal history of other diseases of the respiratory system 04/13/2017    History of acute bronchitis    Personal history of other diseases of the respiratory system 06/07/2019    History of acute bronchitis with bronchospasm    Personal history of other diseases of the respiratory system 02/10/2020    History of sinusitis    Personal history of other diseases of the respiratory system 06/11/2017    History of chronic sinusitis    Personal history of other diseases of the respiratory  system 03/29/2022    History of paranasal sinus congestion    Personal history of other diseases of the respiratory system 07/14/2016    History of acute sinusitis    Personal history of other diseases of the respiratory system 10/17/2022    History of sore throat    Personal history of other endocrine, nutritional and metabolic disease 03/23/2020    History of thyroid nodule    Personal history of other endocrine, nutritional and metabolic disease 03/09/2020    History of hyperthyroidism    Personal history of other infectious and parasitic diseases 04/08/2019    History of traveler's diarrhea    Personal history of other specified conditions 03/23/2020    History of abdominal pain    Personal history of other specified conditions 12/28/2020    History of nipple discharge    Personal history of other specified conditions 01/14/2020    History of lump of right breast    Personal history of other specified conditions 10/01/2019    History of abnormal mammogram    Personal history of other specified conditions 08/29/2022    History of hoarseness    Personal history of other specified conditions 09/07/2021    History of dysphagia    Rash and other nonspecific skin eruption 01/07/2019    Rash    Rash and other nonspecific skin eruption 02/09/2021    Rash    Right upper quadrant pain 12/23/2017    Colicky right upper quadrant pain    Segmental and somatic dysfunction of pelvic region 01/30/2020    Segmental and somatic dysfunction of pelvic region    Sprain of metacarpophalangeal joint of left thumb, initial encounter 03/25/2018    Sprain of metacarpophalangeal (MCP) joint of left thumb, initial encounter    Strain of muscle, fascia and tendon at neck level, initial encounter 03/09/2021    Strain of neck muscle, initial encounter    Strain of muscle, fascia and tendon of right hip, initial encounter 05/27/2020    Tear of right gluteus medius tendon    Thyrotoxicosis, unspecified without thyrotoxic crisis or storm  08/25/2022    Subclinical hyperthyroidism    Trochanteric bursitis, right hip 05/27/2020    Trochanteric bursitis of right hip    Unspecified fall, initial encounter 06/30/2020    Fall with injury    Unspecified injury of left wrist, hand and finger(s), subsequent encounter 05/08/2018    Injury of left thumb, subsequent encounter    Unspecified nonsuppurative otitis media, bilateral 03/18/2021    Bilateral otitis media with effusion    Unspecified symptoms and signs involving the genitourinary system 01/30/2022    UTI symptoms    Urinary tract infection, site not specified 08/19/2021    Acute UTI       Medication Documentation Review Audit       Reviewed by Rodolfo Shelley MD (Physician) on 11/11/23 at 1755      Medication Order Taking? Sig Documenting Provider Last Dose Status   Adderall XR 20 mg 24 hr capsule [id number]  Take 1 capsule (20 mg) by mouth once daily in the morning. Lucie Beavers, APRN-CNP  Active   albuterol 90 mcg/actuation inhaler [id number] No Inhale 2 puffs. EVERY 4-6 HOURS AS NEEDED Historical Provider, MD Taking Active   diphenhydrAMINE (Benadryl Allergy) 25 mg tablet [id number] No Take 1 tablet (25 mg) by mouth. Historical Provider, MD Taking Active   fexofenadine (Allegra) 180 mg tablet [id number] No Take 1 tablet (180 mg) by mouth once daily. Historical Provider, MD Taking Active   fluticasone (Flonase Sensimist) 27.5 mcg/actuation nasal spray [id number] No Administer 2 sprays into each nostril once daily. Historical Provider, MD Taking Active   fluticasone propion-salmeteroL (Advair Diskus) 250-50 mcg/dose diskus inhaler [id number]  Inhale 1 puff 2 times a day. Jackie Powers MD  Active   hydrocortisone 2.5 % cream [id number] No Apply topically. Historical Provider, MD Taking Active   PRENATAL 2-IRON-FOLIC ACID-OM3 ORAL [id number] No Take by mouth. 1 daily Historical Provider, MD Taking Active   prenatal vitamin, iron-folic, (Prenatal Vitamin) 27 mg iron-800 mcg folic acid tablet [id number] No  Take by mouth. Historical Provider, MD Taking Active   SUMAtriptan (Imitrex) 100 mg tablet [id number] No Take 1 tablet (100 mg) by mouth 1 time if needed for migraine. Jackie Powers MD Taking Active   tretinoin (Retin-A) 0.05 % cream [id number] No Apply topically once daily at bedtime. Historical Provider, MD Taking Active                    Allergies   Allergen Reactions    Adhesive Tape-Silicones Rash     paper ok.adhesive and bandaids cause skin reaction    Amoxicillin-Pot Clavulanate Rash    Cefdinir Rash    Ciprofloxacin Rash    House Dust Rash    Levofloxacin Rash    Penicillins Rash       Social History     Socioeconomic History    Marital status: Single     Spouse name: Not on file    Number of children: Not on file    Years of education: Not on file    Highest education level: Not on file   Occupational History    Not on file   Tobacco Use    Smoking status: Never    Smokeless tobacco: Never   Vaping Use    Vaping Use: Never used   Substance and Sexual Activity    Alcohol use: Never    Drug use: Never    Sexual activity: Not on file   Other Topics Concern    Not on file   Social History Narrative    Not on file     Social Determinants of Health     Financial Resource Strain: Not on file   Food Insecurity: Not on file   Transportation Needs: Not on file   Physical Activity: Not on file   Stress: Not on file   Social Connections: Not on file   Intimate Partner Violence: Not on file   Housing Stability: Not on file       Past Surgical History:   Procedure Laterality Date    OTHER SURGICAL HISTORY  10/07/2016    Dental Surgery    OTHER SURGICAL HISTORY  09/20/2019    [place] tooth extraction    OTHER SURGICAL HISTORY  09/20/2019    Varicose vein ligation    OTHER SURGICAL HISTORY  01/11/2014    Vaginal Pap smear    SINUS SURGERY  10/07/2016    Sinus Surgery         Electronically Signed      LENORA Shelley MD      Orthopaedic Hand Surgery      [phone]

## 2023-12-06 DIAGNOSIS — F90.0 ADHD, PREDOMINANTLY INATTENTIVE TYPE: ICD-10-CM

## 2023-12-06 RX ORDER — DEXTROAMPHETAMINE SULFATE, DEXTROAMPHETAMINE SACCHARATE, AMPHETAMINE SULFATE AND AMPHETAMINE ASPARTATE 5; 5; 5; 5 MG/1; MG/1; MG/1; MG/1
20 CAPSULE, EXTENDED RELEASE ORAL EVERY MORNING
Qty: 30 CAPSULE | Refills: 0 | Status: SHIPPED | OUTPATIENT
Start: 2023-12-06 | End: 2024-01-03 | Stop reason: SDUPTHER

## 2023-12-07 DIAGNOSIS — J02.9 PHARYNGITIS, UNSPECIFIED ETIOLOGY: Primary | ICD-10-CM

## 2023-12-07 RX ORDER — AZITHROMYCIN 250 MG/1
TABLET, FILM COATED ORAL
Qty: 6 TABLET | Refills: 0 | Status: SHIPPED | OUTPATIENT
Start: 2023-12-07 | End: 2023-12-11 | Stop reason: SDUPTHER

## 2023-12-11 DIAGNOSIS — J02.9 PHARYNGITIS, UNSPECIFIED ETIOLOGY: ICD-10-CM

## 2023-12-11 RX ORDER — AZITHROMYCIN 250 MG/1
TABLET, FILM COATED ORAL
Qty: 6 TABLET | Refills: 0 | Status: SHIPPED | OUTPATIENT
Start: 2023-12-11 | End: 2023-12-15

## 2023-12-29 DIAGNOSIS — H10.9 CONJUNCTIVITIS OF BOTH EYES, UNSPECIFIED CONJUNCTIVITIS TYPE: Primary | ICD-10-CM

## 2023-12-29 RX ORDER — TOBRAMYCIN AND DEXAMETHASONE 3; 1 MG/ML; MG/ML
1 SUSPENSION/ DROPS OPHTHALMIC
Qty: 5 ML | Refills: 0 | Status: SHIPPED | OUTPATIENT
Start: 2023-12-29 | End: 2024-01-08

## 2024-01-02 DIAGNOSIS — G43.001 MIGRAINE WITHOUT AURA AND WITH STATUS MIGRAINOSUS, NOT INTRACTABLE: ICD-10-CM

## 2024-01-02 RX ORDER — SUMATRIPTAN SUCCINATE 100 MG/1
100 TABLET ORAL ONCE AS NEEDED
Qty: 9 TABLET | Refills: 1 | Status: SHIPPED | OUTPATIENT
Start: 2024-01-02

## 2024-01-03 DIAGNOSIS — F90.0 ADHD, PREDOMINANTLY INATTENTIVE TYPE: ICD-10-CM

## 2024-01-03 RX ORDER — DEXTROAMPHETAMINE SULFATE, DEXTROAMPHETAMINE SACCHARATE, AMPHETAMINE SULFATE AND AMPHETAMINE ASPARTATE 5; 5; 5; 5 MG/1; MG/1; MG/1; MG/1
20 CAPSULE, EXTENDED RELEASE ORAL EVERY MORNING
Qty: 30 CAPSULE | Refills: 0 | Status: SHIPPED | OUTPATIENT
Start: 2024-01-03 | End: 2024-02-05 | Stop reason: SDUPTHER

## 2024-01-18 ENCOUNTER — TELEPHONE (OUTPATIENT)
Dept: OTOLARYNGOLOGY | Facility: HOSPITAL | Age: 39
End: 2024-01-18
Payer: COMMERCIAL

## 2024-01-18 NOTE — TELEPHONE ENCOUNTER
Received call from patient requesting refill on her azelastine.   She hasn't seen Dr. Heath since 2022.  I advised her that she can have her PCP order it for her or she needs an appt with Dr. Heath.   Message left for patient regarding this

## 2024-01-19 ENCOUNTER — OFFICE VISIT (OUTPATIENT)
Dept: ORTHOPEDIC SURGERY | Facility: CLINIC | Age: 39
End: 2024-01-19
Payer: COMMERCIAL

## 2024-01-19 DIAGNOSIS — M77.8 TENDINITIS OF LEFT WRIST: Primary | ICD-10-CM

## 2024-01-19 PROCEDURE — 2500000004 HC RX 250 GENERAL PHARMACY W/ HCPCS (ALT 636 FOR OP/ED): Performed by: ORTHOPAEDIC SURGERY

## 2024-01-19 PROCEDURE — 99212 OFFICE O/P EST SF 10 MIN: CPT | Performed by: ORTHOPAEDIC SURGERY

## 2024-01-19 PROCEDURE — 1036F TOBACCO NON-USER: CPT | Performed by: ORTHOPAEDIC SURGERY

## 2024-01-19 PROCEDURE — 20550 NJX 1 TENDON SHEATH/LIGAMENT: CPT | Performed by: ORTHOPAEDIC SURGERY

## 2024-01-19 PROCEDURE — 2500000005 HC RX 250 GENERAL PHARMACY W/O HCPCS: Performed by: ORTHOPAEDIC SURGERY

## 2024-01-19 RX ADMIN — TRIAMCINOLONE ACETONIDE 20 MG: 40 INJECTION, SUSPENSION INTRA-ARTICULAR; INTRAMUSCULAR at 15:30

## 2024-01-19 RX ADMIN — LIDOCAINE HYDROCHLORIDE 0.5 ML: 10 INJECTION, SOLUTION INFILTRATION; PERINEURAL at 15:30

## 2024-01-19 NOTE — LETTER
February 3, 2024     Jackie Powers MD  [address]  [facility]  [facility] [address]    Patient: Lashaun Cruz   YOB: 1985   Date of Visit: 1/19/2024       Dear Dr. Jackie Powers MD:    Thank you for referring Lashaun Cruz to me for evaluation. Below are my notes for this consultation.  If you have questions, please do not hesitate to call me. I look forward to following your patient along with you.       Sincerely,     Rodolfo Shelley MD      CC: No Recipients  ______________________________________________________________________________________    CHIEF COMPLAINT         Left wrist pain    ASSESSMENT + PLAN    Continue difficulty from left wrist extensor tendinitis    I reviewed the nature of a ECU tendinitis, and discussed the typical self-limited clinical course.  I discussed my recommendation for use of ice or heat and anti-inflammatories or Tylenol as needed for any discomfort.  Continued use of A wrist splint or compressive strap, can help reduce the discomfort but will not completely eliminate it.  Only time can do that.    They may advance activity out of the splint as pain allows.  There are no particular restrictions.    The patient felt that they had already maximized the benefit of conservative care, and wanted to proceed with an injection .  I reviewed the risks of cortisone injection, including infection, hypopigmentation, and fat atrophy.  The transient cortisone effect on blood glucose measurement was also reviewed, and the patient wished to proceed.    After sterile prep, I injected 20 mg of Kenalog and 1 cc of 1% lidocaine, plain to the dorsum of the left wrist sixth compartment.    I discussed the small possibility of needing a surgical tendon debridement, lengthening, or release procedure down the road to resolve things.  Most of these will get better on their own.    Follow-up with any concerns.        HISTORY OF PRESENT ILLNESS        Patient returns today, about 3 months after her last visit.  She has been using a compressive strap around the wrist to help with her left ECU tendinitis.  Things are better, but certainly not well.  At this point she wants to proceed with a cortisone injection to try to eliminate the discomfort and let her get back to ADLs.  No interval trauma.  No popping, catching, or subjective instability.  No numbness or tingling.  No other new concerns.      PHYSICAL EXAM       She remains well-developed, well-nourished female in no acute distress.  She appears her stated age and has a pleasant affect.  Skin of the left hand and wrist remains intact with no erythema, ecchymosis, or diffuse swelling.  There is discomfort with palpation over the ECU.  ECU Synergy test is significantly positive and reproduces chief complaint.  No tenderness in the fovea.  Tendon is stable by FUSS test.  DRUJ stable in all stations.  Symmetric finger, wrist, forearm range of motion.  Sensation intact to light touch in all distributions.  Capillary refill less than 2 seconds.  2+ radial and ulnar pulses.      IMAGING / LABS / EMGs    None today      PROCEDURE    Hand / UE Inj/Asp: L extensor compartment 6 for tendonitis on 1/19/2024 3:30 PM  Medications: 20 mg triamcinolone acetonide 40 mg/mL; 0.5 mL lidocaine 10 mg/mL (1 %)  Outcome: tolerated well, no immediate complications  Procedure, treatment alternatives, risks and benefits explained, specific risks discussed. Consent was given by the patient.             Electronically Signed      LENORA Shelley MD      Orthopaedic Hand Surgery      [phone]

## 2024-01-19 NOTE — PROGRESS NOTES
CHIEF COMPLAINT         Left wrist pain    ASSESSMENT + PLAN    Continue difficulty from left wrist extensor tendinitis    I reviewed the nature of a ECU tendinitis, and discussed the typical self-limited clinical course.  I discussed my recommendation for use of ice or heat and anti-inflammatories or Tylenol as needed for any discomfort.  Continued use of A wrist splint or compressive strap, can help reduce the discomfort but will not completely eliminate it.  Only time can do that.    They may advance activity out of the splint as pain allows.  There are no particular restrictions.    The patient felt that they had already maximized the benefit of conservative care, and wanted to proceed with an injection .  I reviewed the risks of cortisone injection, including infection, hypopigmentation, and fat atrophy.  The transient cortisone effect on blood glucose measurement was also reviewed, and the patient wished to proceed.    After sterile prep, I injected 20 mg of Kenalog and 1 cc of 1% lidocaine, plain to the dorsum of the left wrist sixth compartment.    I discussed the small possibility of needing a surgical tendon debridement, lengthening, or release procedure down the road to resolve things.  Most of these will get better on their own.    Follow-up with any concerns.        HISTORY OF PRESENT ILLNESS       Patient returns today, about 3 months after her last visit.  She has been using a compressive strap around the wrist to help with her left ECU tendinitis.  Things are better, but certainly not well.  At this point she wants to proceed with a cortisone injection to try to eliminate the discomfort and let her get back to ADLs.  No interval trauma.  No popping, catching, or subjective instability.  No numbness or tingling.  No other new concerns.      PHYSICAL EXAM       She remains well-developed, well-nourished female in no acute distress.  She appears her stated age and has a pleasant affect.  Skin of the  left hand and wrist remains intact with no erythema, ecchymosis, or diffuse swelling.  There is discomfort with palpation over the ECU.  ECU Synergy test is significantly positive and reproduces chief complaint.  No tenderness in the fovea.  Tendon is stable by FUSS test.  DRUJ stable in all stations.  Symmetric finger, wrist, forearm range of motion.  Sensation intact to light touch in all distributions.  Capillary refill less than 2 seconds.  2+ radial and ulnar pulses.      IMAGING / LABS / EMGs    None today      PROCEDURE    Hand / UE Inj/Asp: L extensor compartment 6 for tendonitis on 1/19/2024 3:30 PM  Medications: 20 mg triamcinolone acetonide 40 mg/mL; 0.5 mL lidocaine 10 mg/mL (1 %)  Outcome: tolerated well, no immediate complications  Procedure, treatment alternatives, risks and benefits explained, specific risks discussed. Consent was given by the patient.             Electronically Signed      LENORA Shelley MD      Orthopaedic Hand Surgery      [phone]

## 2024-02-03 PROBLEM — M77.8 TENDINITIS OF LEFT WRIST: Status: ACTIVE | Noted: 2024-02-03

## 2024-02-03 RX ORDER — TRIAMCINOLONE ACETONIDE 40 MG/ML
20 INJECTION, SUSPENSION INTRA-ARTICULAR; INTRAMUSCULAR
Status: COMPLETED | OUTPATIENT
Start: 2024-01-19 | End: 2024-01-19

## 2024-02-03 RX ORDER — LIDOCAINE HYDROCHLORIDE 10 MG/ML
0.5 INJECTION INFILTRATION; PERINEURAL
Status: COMPLETED | OUTPATIENT
Start: 2024-01-19 | End: 2024-01-19

## 2024-02-05 ENCOUNTER — TELEPHONE (OUTPATIENT)
Dept: PRIMARY CARE | Facility: CLINIC | Age: 39
End: 2024-02-05
Payer: COMMERCIAL

## 2024-02-05 DIAGNOSIS — F90.0 ADHD, PREDOMINANTLY INATTENTIVE TYPE: ICD-10-CM

## 2024-02-05 RX ORDER — DEXTROAMPHETAMINE SULFATE, DEXTROAMPHETAMINE SACCHARATE, AMPHETAMINE SULFATE AND AMPHETAMINE ASPARTATE 5; 5; 5; 5 MG/1; MG/1; MG/1; MG/1
20 CAPSULE, EXTENDED RELEASE ORAL EVERY MORNING
Qty: 30 CAPSULE | Refills: 0 | Status: SHIPPED | OUTPATIENT
Start: 2024-02-05 | End: 2024-03-06 | Stop reason: SDUPTHER

## 2024-02-05 NOTE — TELEPHONE ENCOUNTER
Left a detailed message for the patient medication was sent over today. Will need to call her local pharmacy to see if they have it ready for her.

## 2024-02-05 NOTE — TELEPHONE ENCOUNTER
Patient is out of medication for Adderall. Patient received a message that the medication was refilled, but she would like to confirm this, as she took her last pill today. Please call back at home number.

## 2024-03-06 DIAGNOSIS — F90.0 ADHD, PREDOMINANTLY INATTENTIVE TYPE: ICD-10-CM

## 2024-03-06 RX ORDER — DEXTROAMPHETAMINE SULFATE, DEXTROAMPHETAMINE SACCHARATE, AMPHETAMINE SULFATE AND AMPHETAMINE ASPARTATE 5; 5; 5; 5 MG/1; MG/1; MG/1; MG/1
20 CAPSULE, EXTENDED RELEASE ORAL EVERY MORNING
Qty: 30 CAPSULE | Refills: 0 | Status: SHIPPED | OUTPATIENT
Start: 2024-03-06 | End: 2024-04-03 | Stop reason: SDUPTHER

## 2024-04-01 ENCOUNTER — APPOINTMENT (OUTPATIENT)
Dept: OBSTETRICS AND GYNECOLOGY | Facility: CLINIC | Age: 39
End: 2024-04-01
Payer: COMMERCIAL

## 2024-04-03 DIAGNOSIS — F90.0 ADHD, PREDOMINANTLY INATTENTIVE TYPE: ICD-10-CM

## 2024-04-03 RX ORDER — DEXTROAMPHETAMINE SULFATE, DEXTROAMPHETAMINE SACCHARATE, AMPHETAMINE SULFATE AND AMPHETAMINE ASPARTATE 5; 5; 5; 5 MG/1; MG/1; MG/1; MG/1
20 CAPSULE, EXTENDED RELEASE ORAL EVERY MORNING
Qty: 30 CAPSULE | Refills: 0 | Status: SHIPPED | OUTPATIENT
Start: 2024-04-03 | End: 2024-04-25 | Stop reason: SDUPTHER
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
## 2024-04-25 ENCOUNTER — OFFICE VISIT (OUTPATIENT)
Dept: PRIMARY CARE | Facility: CLINIC | Age: 39
End: 2024-04-25
Payer: COMMERCIAL

## 2024-04-25 VITALS
DIASTOLIC BLOOD PRESSURE: 70 MMHG | TEMPERATURE: 97.4 F | OXYGEN SATURATION: 98 % | BODY MASS INDEX: 25.91 KG/M2 | HEIGHT: 68 IN | SYSTOLIC BLOOD PRESSURE: 120 MMHG | WEIGHT: 171 LBS | HEART RATE: 64 BPM

## 2024-04-25 DIAGNOSIS — O99.519 ASTHMA AFFECTING PREGNANCY, ANTEPARTUM (HHS-HCC): ICD-10-CM

## 2024-04-25 DIAGNOSIS — J45.20 MILD INTERMITTENT ASTHMA WITHOUT COMPLICATION (HHS-HCC): ICD-10-CM

## 2024-04-25 DIAGNOSIS — Q21.12 PATENT FORAMEN OVALE (HHS-HCC): Primary | ICD-10-CM

## 2024-04-25 DIAGNOSIS — C73 PAPILLARY CARCINOMA OF THYROID (MULTI): ICD-10-CM

## 2024-04-25 DIAGNOSIS — R05.2 SUBACUTE COUGH: ICD-10-CM

## 2024-04-25 DIAGNOSIS — D80.3 SELECTIVE DEFICIENCY OF IGG (MULTI): ICD-10-CM

## 2024-04-25 DIAGNOSIS — F90.0 ADHD, PREDOMINANTLY INATTENTIVE TYPE: ICD-10-CM

## 2024-04-25 DIAGNOSIS — J45.909 ASTHMA AFFECTING PREGNANCY, ANTEPARTUM (HHS-HCC): ICD-10-CM

## 2024-04-25 PROBLEM — J96.01 ACUTE RESPIRATORY FAILURE WITH HYPOXIA (MULTI): Status: RESOLVED | Noted: 2024-04-25 | Resolved: 2024-04-25

## 2024-04-25 PROBLEM — J96.01 ACUTE RESPIRATORY FAILURE WITH HYPOXIA (MULTI): Status: ACTIVE | Noted: 2024-04-25

## 2024-04-25 PROCEDURE — 3074F SYST BP LT 130 MM HG: CPT | Performed by: INTERNAL MEDICINE

## 2024-04-25 PROCEDURE — 3078F DIAST BP <80 MM HG: CPT | Performed by: INTERNAL MEDICINE

## 2024-04-25 PROCEDURE — 99214 OFFICE O/P EST MOD 30 MIN: CPT | Performed by: INTERNAL MEDICINE

## 2024-04-25 RX ORDER — FLUTICASONE PROPIONATE AND SALMETEROL 250; 50 UG/1; UG/1
1 POWDER RESPIRATORY (INHALATION)
Qty: 60 EACH | Refills: 11 | Status: SHIPPED | OUTPATIENT
Start: 2024-04-25

## 2024-04-25 RX ORDER — DEXTROAMPHETAMINE SULFATE, DEXTROAMPHETAMINE SACCHARATE, AMPHETAMINE SULFATE AND AMPHETAMINE ASPARTATE 5; 5; 5; 5 MG/1; MG/1; MG/1; MG/1
20 CAPSULE, EXTENDED RELEASE ORAL EVERY MORNING
Qty: 30 CAPSULE | Refills: 0 | Status: SHIPPED | OUTPATIENT
Start: 2024-04-25 | End: 2024-06-04 | Stop reason: SDUPTHER

## 2024-04-25 RX ORDER — AZELASTINE 1 MG/ML
1 SPRAY, METERED NASAL 2 TIMES DAILY
Qty: 30 ML | Refills: 11 | Status: SHIPPED | OUTPATIENT
Start: 2024-04-25

## 2024-04-25 RX ORDER — AZELASTINE 1 MG/ML
1 SPRAY, METERED NASAL 2 TIMES DAILY
COMMUNITY
End: 2024-04-25 | Stop reason: SDUPTHER

## 2024-04-25 RX ORDER — BENZONATATE 100 MG/1
100 CAPSULE ORAL 3 TIMES DAILY PRN
Qty: 42 CAPSULE | Refills: 0 | Status: SHIPPED | OUTPATIENT
Start: 2024-04-25 | End: 2024-05-25

## 2024-04-29 ENCOUNTER — APPOINTMENT (OUTPATIENT)
Dept: OBSTETRICS AND GYNECOLOGY | Facility: CLINIC | Age: 39
End: 2024-04-29
Payer: COMMERCIAL

## 2024-05-01 ENCOUNTER — APPOINTMENT (OUTPATIENT)
Dept: OBSTETRICS AND GYNECOLOGY | Facility: CLINIC | Age: 39
End: 2024-05-01
Payer: COMMERCIAL

## 2024-05-02 ENCOUNTER — INITIAL PRENATAL (OUTPATIENT)
Dept: OBSTETRICS AND GYNECOLOGY | Facility: CLINIC | Age: 39
End: 2024-05-02
Payer: COMMERCIAL

## 2024-05-02 VITALS — BODY MASS INDEX: 26.43 KG/M2 | DIASTOLIC BLOOD PRESSURE: 70 MMHG | SYSTOLIC BLOOD PRESSURE: 110 MMHG | WEIGHT: 173.8 LBS

## 2024-05-02 DIAGNOSIS — Z85.850 HX OF PAPILLARY THYROID CARCINOMA: ICD-10-CM

## 2024-05-02 DIAGNOSIS — Q21.12 PATENT FORAMEN OVALE (HHS-HCC): ICD-10-CM

## 2024-05-02 DIAGNOSIS — Z3A.21 21 WEEKS GESTATION OF PREGNANCY (HHS-HCC): ICD-10-CM

## 2024-05-02 DIAGNOSIS — F90.0 ATTENTION DEFICIT HYPERACTIVITY DISORDER (ADHD), PREDOMINANTLY INATTENTIVE TYPE: ICD-10-CM

## 2024-05-02 DIAGNOSIS — O09.30 LATE PRENATAL CARE (HHS-HCC): ICD-10-CM

## 2024-05-02 DIAGNOSIS — Z34.81 ENCOUNTER FOR SUPERVISION OF NORMAL PREGNANCY IN MULTIGRAVIDA IN FIRST TRIMESTER (HHS-HCC): Primary | ICD-10-CM

## 2024-05-02 DIAGNOSIS — O09.522 MULTIGRAVIDA OF ADVANCED MATERNAL AGE IN SECOND TRIMESTER (HHS-HCC): ICD-10-CM

## 2024-05-02 DIAGNOSIS — Z32.01 PREGNANCY TEST POSITIVE (HHS-HCC): ICD-10-CM

## 2024-05-02 DIAGNOSIS — Z87.42 HISTORY OF ABNORMAL CERVICAL PAP SMEAR: ICD-10-CM

## 2024-05-02 LAB — PREGNANCY TEST URINE, POC: POSITIVE

## 2024-05-02 PROCEDURE — 87491 CHLMYD TRACH DNA AMP PROBE: CPT

## 2024-05-02 PROCEDURE — 81025 URINE PREGNANCY TEST: CPT

## 2024-05-02 PROCEDURE — 0500F INITIAL PRENATAL CARE VISIT: CPT

## 2024-05-02 PROCEDURE — 87800 DETECT AGNT MULT DNA DIREC: CPT

## 2024-05-02 PROCEDURE — 87591 N.GONORRHOEAE DNA AMP PROB: CPT

## 2024-05-02 PROCEDURE — 87086 URINE CULTURE/COLONY COUNT: CPT

## 2024-05-02 NOTE — PROGRESS NOTES
Assessment   Diagnoses and all orders for this visit:  Encounter for supervision of normal pregnancy in multigravida in first trimester ([facility])  Late prenatal care ([facility])  21 weeks gestation of pregnancy ([facility])  -     US MAC OB imaging order; Future  -     CBC Anemia Panel With Reflex,Pregnancy; Future  -     HEMOGLOBIN IDENTIFICATION WITH PATH REVIEW; Future  -     Hepatitis B surface antigen; Future  -     Hepatitis C antibody; Future  -     Rubella Antibody, IgG; Future  -     Syphilis Screen with Reflex; Future  -     HIV 1/2 Antigen/Antibody Screen with Reflex to Confirmation; Future  -     Varicella Zoster Antibody, IgG; Future  -     Urine Culture  -     C. Trachomatis / N. Gonorrhoeae, Amplified Detection  Pregnancy test positive ([facility])  -     POCT pregnancy, urine manually resulted  Multigravida of advanced maternal age in second trimester ([facility])  Hx of papillary thyroid carcinoma  Attention deficit hyperactivity disorder (ADHD), predominantly inattentive type  Patent foramen ovale ([facility])  History of abnormal cervical Pap smear    Plan   - New OB resources provided and reviewed with particular attention to dietary, travel, and medication restrictions  - Oriented to practice, CNM vs. MD care. Patient to be presented at case review for Adderall use and patent foramen ovale  - Reviewed bleeding precautions, warning signs, when to call provider; phone number provided  - Routine NOB labs ordered  - Discussed Centering Pregnancy with patient, declined at this time.   - Anatomy ultrasound ordered, has appointment for .  - Return in 4 weeks for routine prenatal care    JAMAL Roa-CNM    Subjective   Lashaun Cruz is a 38 y.o.  at 21w3d with a working estimated date of delivery of 2024, by Ultrasound who presents for an initial prenatal visit.     Patient currently experiencing:  fetal movement.   Bleeding or cramping since LMP: no  Taking prenatal vitamin:  Yes  Ultrasound completed this pregnancy: Yes - patient received US  from [facility]. SIUP visualized at that time.   Last pap: 10/09/2020, ASCUS w/+HPV, patient declines repeat at this time. Plan for PAP postpartum.     PMH- Reviewed.   Significant for:   Asthma- patient reports that at this time she only needs albuterol during respiratory infections.   ADHD- patient on Adderall XR 20 mg 24 hr capsule - reports that she plans to stay on medication throughout pregnancy.   Patent foramen ovale  H/o thyroid carcinoma     Prior pregnancy complications: Covid during pregnancy > Acute respiratory failure w/hypoxia hospitalized - had to be discharged on 2 L oxygen.   History of hypertension:  No    Review of Systems   All other systems reviewed and are negative.     OB History    Para Term  AB Living   2 1 1     1   SAB IAB Ectopic Multiple Live Births           1      # Outcome Date GA Lbr Travon/2nd Weight Sex Delivery Anes PTL Lv   2 Current            1 Term 23 39w0d   M Vag-Spont   HAIDER        Past Medical History:   Diagnosis Date    Acute candidiasis of vulva and vagina 2017    Vaginal candidiasis    Acute maxillary sinusitis, unspecified 2022    Sinusitis, acute, maxillary    Acute vaginitis 2020    Bacterial vaginitis    Cervical disc disorder, unspecified, unspecified cervical region 2020    Disorder of intervertebral disc of cervical spine    Cutaneous abscess of unspecified foot 2014    Toe abscess    Dermatitis, unspecified 2017    Eczema of left hand    Displaced fracture of shaft of left clavicle, initial encounter for closed fracture 10/11/2021    Closed displaced fracture of shaft of left clavicle, initial encounter    Dyshidrosis (pompholyx) 2019    Dyshidrosis    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    Iliotibial band syndrome, right leg 2019    Iliotibial band syndrome of right side    Local infection of the skin and  subcutaneous tissue, unspecified 04/25/2014    Toe infection    Localized swelling, mass and lump, head 10/14/2021    Localized swelling, mass and lump, head    Otalgia, right ear 02/01/2017    Chronic right ear pain    Other chest pain 01/15/2018    Chest pain, localized    Other intervertebral disc displacement, lumbar region 10/20/2021    Lumbar disc herniation    Other specified symptoms and signs involving the circulatory and respiratory systems 10/11/2016    Globus sensation    Other sprain of right hip, initial encounter 05/05/2020    Tear of right acetabular labrum, initial encounter    Other symptoms and signs involving the musculoskeletal system 09/27/2021    Weakness of right hip    Pain in left finger(s) 03/20/2018    Thumb pain, left    Pain in right hip 01/26/2021    Hip pain, right    Pain in unspecified knee     Joint pain, knee    Pelvic and perineal pain 03/23/2020    Pain, pelvic, female    Personal history of diseases of the skin and subcutaneous tissue 11/14/2019    History of pustular acne    Personal history of other benign neoplasm 01/26/2021    History of other benign neoplasm    Personal history of other diseases of the digestive system     History of hemorrhoids    Personal history of other diseases of the female genital tract 06/30/2020    History of dysfunctional uterine bleeding    Personal history of other diseases of the musculoskeletal system and connective tissue 08/14/2013    History of low back pain    Personal history of other diseases of the respiratory system 04/13/2017    History of acute bronchitis    Personal history of other endocrine, nutritional and metabolic disease 03/23/2020    History of thyroid nodule    Personal history of other endocrine, nutritional and metabolic disease 03/09/2020    History of hyperthyroidism    Personal history of other infectious and parasitic diseases 04/08/2019    History of traveler's diarrhea    Rash and other nonspecific skin eruption  02/09/2021    Rash    Right upper quadrant pain 12/23/2017    Colicky right upper quadrant pain    Segmental and somatic dysfunction of pelvic region 01/30/2020    Segmental and somatic dysfunction of pelvic region    Sprain of metacarpophalangeal joint of left thumb, initial encounter 03/25/2018    Sprain of metacarpophalangeal (MCP) joint of left thumb, initial encounter    Strain of muscle, fascia and tendon of right hip, initial encounter 05/27/2020    Tear of right gluteus medius tendon    Thyrotoxicosis, unspecified without thyrotoxic crisis or storm 08/25/2022    Subclinical hyperthyroidism    Trochanteric bursitis, right hip 05/27/2020    Trochanteric bursitis of right hip    Unspecified injury of left wrist, hand and finger(s), subsequent encounter 05/08/2018    Injury of left thumb, subsequent encounter    Urinary tract infection, site not specified 08/19/2021    Acute UTI      Past Surgical History:   Procedure Laterality Date    OTHER SURGICAL HISTORY  10/07/2016    Dental Surgery    OTHER SURGICAL HISTORY  09/20/2019    Devine tooth extraction    OTHER SURGICAL HISTORY  09/20/2019    Varicose vein ligation    OTHER SURGICAL HISTORY  01/11/2014    Vaginal Pap smear    SINUS SURGERY  10/07/2016    Sinus Surgery      Social History     Socioeconomic History    Marital status: Single     Spouse name: None    Number of children: None    Years of education: None    Highest education level: None   Occupational History    None   Tobacco Use    Smoking status: Never    Smokeless tobacco: Never   Vaping Use    Vaping status: Never Used   Substance and Sexual Activity    Alcohol use: Never    Drug use: Never    Sexual activity: None   Other Topics Concern    None   Social History Narrative    None     Social Determinants of Health     Financial Resource Strain: Not on file   Food Insecurity: Not on file   Transportation Needs: Not on file   Physical Activity: Not on file   Stress: Not on file   Social Connections:  Not on file   Intimate Partner Violence: Not on file        Objective   Physical Exam  Weight: 78.8 kg (173 lb 12.8 oz)  TW.27 kg (2 lb 12.8 oz)   Expected Total Weight Gain: 7 kg (15 lb)-11.5 kg (25 lb)   Pregravid BMI: 26.01  BP: 110/70    General:   Alert and oriented x 3   Heart:  Thyroid: Regular rate, rhythm  Thyroidectomy Scar, residual thyroid with no palpable nodules.    Lungs:  Breast: Clear to auscultation bilaterally  Symmetrical, no skin changes/nipple discharge, redness, tenderness, no masses palpated bilaterally   Abdomen: Soft, non tender   Pelvic exam declined today.       Postpartum Depression: Not on file        Pregnancy Problems (from 24 to present)       No problems associated with this episode.               Important in pregnancy    Avoidance of alcohol, tobacco and drug use   Dietary restrictions reviewed including avoiding raw or poorly cooked meat, lunch meat and soft cheeses  3.    Adequate water intake.  Avoid empty calories with juices  4.    Recommendation for weight gain based on initial BMI (body mass index)  5.    Limit caffeine to less than 200-300 mg/day  6.    Take folic acid 400 mcg daily.  Incorporate 5,000u Vitamin D3 per day.  Discuss Magnesium Supplementation  7.    Importance of good sleep hygiene and avoidance of laying on back after 15 weeks  8.    Encourage daily physical activity of 30 minutes a day the majority of the days of the week  9.    Discussed normal physiologic changes:  Round ligament pain, nausea, breast tenderness  10.  Discussed natural remedies, vitamins and prescription medications for nausea  11.  Baby aspirin 162mg daily (two baby aspirin) for the reduction of pre-eclampsia during pregnancy.  Even if you have          never had any blood pressure issues, you can develop hypertension during your pregnancy.  This has been well          Studied and safe to take starting at 12 weeks gestation until after the birth of your baby.     **IF AT  ANYTIME DURING YOUR PREGNANCY YOU HAVE CONCERNS THAT YOU CANNOT AFFORD HEALTHY FOOD PLEASE LET US KNOW!**  We have a Food for Life program and would be happy to place a referral for you.  It is so important to eat healthy during your pregnancy and we treat food as medicine.  Healthy food is expensive!  This program will allow you and your family up to 4 to receive food and recipes for one week per month.  This needs to be renewed every 6 months.     Ultrasound and screening for aneuploidies (Down Syndrome/Trisomy 21, Trisomy 13 + 18)  A requisition has been placed for a dating ultrasound.  Please call to get that scheduled.    At this ultrasound they will ask you if your would like to proceed with screening for genetic disorders that are listed above.  They will do that with an ultrasound at approximately 13 weeks and we also draw blood work for screening which includes the fetal sex if you desire to know.     Ultrasound for anatomy will be done at 19 weeks.  Based on risk factors and any concerns the maternal fetal medicine provider has, you may need a repeat ultrasound.  Healthy pregnancies that do not have any other concerns by the midwife or maternal fetal medicine do not have any repeat ultrasounds done.     Labs:   An order will be placed for your new ob labs.  Please get those done at the time of your ultrasound.  They will collect          multiple tubes of blood for new ob labs and also urine for STI testing and a urine culture.   If there are any concerns with any blood work or urine testing WE WILL CALL YOU OR COMMUNICATE VIA          [employer]!!!   The biggest concerns our patients have is when they see their complete blood count.  The reference          range in the result is for a non pregnant person!  We will notify you if there is any need to start an iron supplement.  3.    At 26-28 weeks a glucose test is ordered to see if you have gestational diabetes.  We also reassess if you have          Anemia,  "which can be common in pregnancy  4.    Group B strep culture will be done at 36 weeks gestation.     We also recommend that you be screened once in your life for Cystic Fibrosis and Spinal Muscular Atrophy.  This assesses if we need your partner to be tested if you are a carrier of a gene that can be passed along to your baby.  For this to happen, both parents must be a carrier to the gene.     Choices for care and hospital for birth:  I am a Certified Nurse Midwife and practice in a group setting, which means that any of the midwives in my group practice may be there for your birth.  We care for healthy females during pregnancy and labor/birth.  We practice in collaboration with physicians within our group.  If there are any concerns with your pregnancy, labor or birth our physicians work closely with us.       The midwives in our practice strongly encourage you to explore the option of Centering Pregnancy which has been studied for better birth outcomes!  Care will be done in a group setting with 1:1 time with a midwife and then in depth education about every stage of your pregnancy, labor/birth,  care, feeding choices, pediatrician options, birth control and coping techniques for the first few weeks after birth.  We have day groups and our [place] location and a Monday evening group at [facility].  The groups follow your normal prenatal schedule and yes, we keep in contact and I see you at the end of your pregnancy.     There are certain medical conditions that \"risks you out\" of midwifery care that we are constantly assessing for.  Some conditions during your pregnancy that would risk you out of midwifery care would be:   Severe growth restriction of your baby   Labor/Birth  less than 35 weeks   Severe pre-eclampsia at any time during your pregnancy/labor/birth   Gestational Diabetes needing medication (insulin) to control your blood sugars   If you decline or do not complete your glucose testing to " "rule out diet controlled diabetes by 32 weeks   If you are diagnosed with chronic hypertension during your pregnancy and need to start medication     The options for birth where we provide 24/7 Certified Nurse Midwifery coverage with a board certified OB/GYN, in house anesthesia and neonataology coverage are:     [facility] at [place], [place]     To call for questions regarding your care of if you are in labor is [phone]  My nurses name is Yolanda Yepezemggan who can answer questions and keep me updated should any questions or concerns arise during your pregnancy.     After hours, the answering service will ask you where you intended to give birth and connect you to the midwife on call at [facility] or the [facility].     If you would like to tour either facility:  Please call the Childbirth education line at [phone]     Danger signs to report:  Seek medical care immediately if you have pain that is doubling you over or vaginal bleeding that is heavier than a  period  Notify the office should you have any burning, urgency, frequency of urination or other concerning symptoms.     Medications that are safe for common discomforts of pregnancy:   Tylenol   Tums or Papaya extract for any upset stomach or heartburn   Zyrtec, Claritin, Benadryl for allergy symptoms   Sudafed or Robitussin for cold symptoms    Work restrictions:  A normal healthy pregnancy without any complications are able to have the standard pregnancy work restrictions which is no pushing/pulling/lifting greater than 25 pounds independently     FMLA paperwork  Can be brought to the office for us to fill out for when you are starting your maternity leave (either your scheduled date of going to the hospital or your due date).  We cannot give out early FMLA when there is no documented medical conditions that are considered \"normal " "pregnancy\" events.     Comfort measures   Chiropractors are great for alleviation of ligament pain   Yoga is good for your ligaments and mentally preparing for baby and labor.  A prenatal yoga class is recommended.  3.     Prenatal massages are fine  4.     A maternity support belt is an amazing thing that can help ligament pain -- can be purchased on Amazon  5.     Good supportive shoes are key to helping with ligament pain     Dental care  It is very important to see a dentist during your pregnancy for routine cleaning and also if you develop any dental pain during your pregnancy.  Healthy gums and teeth are very important during your pregnancy.  We can provide you with a dental letter if your dentist would like one.     Thank you for choosing our practice and [facility] for you healthcare!  I am excited to partner with you during this very special time!      If there is anything I can do to help make your experience is positive, please come to your visits with questions and concerns and do not be afraid to ask what is on your mind!  We will see you in the office every 4 weeks until you are 30 weeks, then every 2 weeks until 36 weeks and then weekly until your baby is born.           "

## 2024-05-03 LAB
BACTERIA UR CULT: NORMAL
C TRACH RRNA SPEC QL NAA+PROBE: NEGATIVE
N GONORRHOEA DNA SPEC QL PROBE+SIG AMP: NEGATIVE

## 2024-05-09 PROBLEM — O16.3 HYPERTENSION AFFECTING PREGNANCY IN THIRD TRIMESTER (HHS-HCC): Status: RESOLVED | Noted: 2023-05-16 | Resolved: 2024-05-09

## 2024-05-09 PROBLEM — R09.81: Status: RESOLVED | Noted: 2023-05-16 | Resolved: 2024-05-09

## 2024-05-09 PROBLEM — U07.1 COVID-19 AFFECTING PREGNANCY, ANTEPARTUM (HHS-HCC): Status: RESOLVED | Noted: 2023-10-04 | Resolved: 2024-05-09

## 2024-05-09 PROBLEM — O09.91: Status: RESOLVED | Noted: 2023-05-16 | Resolved: 2024-05-09

## 2024-05-09 PROBLEM — N81.89 PELVIC FLOOR WEAKNESS: Status: RESOLVED | Noted: 2023-05-16 | Resolved: 2024-05-09

## 2024-05-09 PROBLEM — F98.8 ATTENTION DEFICIT DISORDER (ADD) WITHOUT HYPERACTIVITY: Chronic | Status: RESOLVED | Noted: 2023-04-19 | Resolved: 2024-05-09

## 2024-05-09 PROBLEM — O99.891: Status: RESOLVED | Noted: 2023-05-16 | Resolved: 2024-05-09

## 2024-05-09 PROBLEM — Z3A.38 38 WEEKS GESTATION OF PREGNANCY (HHS-HCC): Status: RESOLVED | Noted: 2023-10-11 | Resolved: 2024-05-09

## 2024-05-09 PROBLEM — J02.9 SORE THROAT: Status: RESOLVED | Noted: 2023-05-16 | Resolved: 2024-05-09

## 2024-05-09 PROBLEM — Z3A.39 39 WEEKS GESTATION OF PREGNANCY (HHS-HCC): Status: RESOLVED | Noted: 2023-04-26 | Resolved: 2024-05-09

## 2024-05-09 PROBLEM — G43.909 MIGRAINE HEADACHE: Status: RESOLVED | Noted: 2023-05-16 | Resolved: 2024-05-09

## 2024-05-09 PROBLEM — O98.519 COVID-19 AFFECTING PREGNANCY, ANTEPARTUM (HHS-HCC): Status: RESOLVED | Noted: 2023-10-04 | Resolved: 2024-05-09

## 2024-05-09 PROBLEM — Z34.00 PRIMIGRAVIDA, ANTEPARTUM (HHS-HCC): Status: RESOLVED | Noted: 2023-10-11 | Resolved: 2024-05-09

## 2024-05-09 PROBLEM — R30.9 PAINFUL URINATION: Status: RESOLVED | Noted: 2023-05-16 | Resolved: 2024-05-09

## 2024-05-09 PROBLEM — O99.513: Status: RESOLVED | Noted: 2023-05-16 | Resolved: 2024-05-09

## 2024-05-09 PROBLEM — O46.90 VAGINAL BLEEDING IN PREGNANCY (HHS-HCC): Status: RESOLVED | Noted: 2023-05-16 | Resolved: 2024-05-09

## 2024-05-09 PROBLEM — Z87.42 HISTORY OF ABNORMAL CERVICAL PAP SMEAR: Status: ACTIVE | Noted: 2024-05-09

## 2024-05-09 PROBLEM — J45.909 ASTHMA AFFECTING PREGNANCY, ANTEPARTUM (HHS-HCC): Status: RESOLVED | Noted: 2023-05-16 | Resolved: 2024-05-09

## 2024-05-09 PROBLEM — O09.519 ADVANCED MATERNAL AGE, PRIMIGRAVIDA, ANTEPARTUM (HHS-HCC): Status: RESOLVED | Noted: 2023-10-04 | Resolved: 2024-05-09

## 2024-05-09 PROBLEM — O99.519 ASTHMA AFFECTING PREGNANCY, ANTEPARTUM (HHS-HCC): Status: RESOLVED | Noted: 2023-05-16 | Resolved: 2024-05-09

## 2024-05-15 ENCOUNTER — LAB (OUTPATIENT)
Dept: LAB | Facility: LAB | Age: 39
End: 2024-05-15
Payer: COMMERCIAL

## 2024-05-15 ENCOUNTER — HOSPITAL ENCOUNTER (OUTPATIENT)
Dept: RADIOLOGY | Facility: CLINIC | Age: 39
Discharge: HOME | End: 2024-05-15
Payer: COMMERCIAL

## 2024-05-15 DIAGNOSIS — Z03.73 ENCOUNTER FOR SUSPECTED FETAL ANOMALY RULED OUT: ICD-10-CM

## 2024-05-15 DIAGNOSIS — O09.529 AMA (ADVANCED MATERNAL AGE) MULTIGRAVIDA 35+ (HHS-HCC): ICD-10-CM

## 2024-05-15 DIAGNOSIS — Z3A.21 21 WEEKS GESTATION OF PREGNANCY (HHS-HCC): ICD-10-CM

## 2024-05-15 DIAGNOSIS — C73 PAPILLARY CARCINOMA OF THYROID (MULTI): ICD-10-CM

## 2024-05-15 LAB
ERYTHROCYTE [DISTWIDTH] IN BLOOD BY AUTOMATED COUNT: 13.2 % (ref 11.5–14.5)
HBV SURFACE AG SERPL QL IA: NONREACTIVE
HCT VFR BLD AUTO: 36.5 % (ref 36–46)
HCV AB SER QL: NONREACTIVE
HGB BLD-MCNC: 12.2 G/DL (ref 12–16)
HIV 1+2 AB+HIV1 P24 AG SERPL QL IA: NONREACTIVE
MCH RBC QN AUTO: 31.2 PG (ref 26–34)
MCHC RBC AUTO-ENTMCNC: 33.4 G/DL (ref 32–36)
MCV RBC AUTO: 93 FL (ref 80–100)
NRBC BLD-RTO: 0 /100 WBCS (ref 0–0)
PLATELET # BLD AUTO: 282 X10*3/UL (ref 150–450)
RBC # BLD AUTO: 3.91 X10*6/UL (ref 4–5.2)
REFLEX ADDED, ANEMIA PANEL: NORMAL
RUBV IGG SERPL IA-ACNC: 1.8 IA
RUBV IGG SERPL QL IA: POSITIVE
T4 FREE SERPL-MCNC: 0.67 NG/DL (ref 0.61–1.12)
TREPONEMA PALLIDUM IGG+IGM AB [PRESENCE] IN SERUM OR PLASMA BY IMMUNOASSAY: NONREACTIVE
TSH SERPL-ACNC: 1.5 MIU/L (ref 0.44–3.98)
VARICELLA ZOSTER IGG INDEX: 4.6 IA
VZV IGG SER QL IA: POSITIVE
WBC # BLD AUTO: 11.6 X10*3/UL (ref 4.4–11.3)

## 2024-05-15 PROCEDURE — 83020 HEMOGLOBIN ELECTROPHORESIS: CPT

## 2024-05-15 PROCEDURE — 87389 HIV-1 AG W/HIV-1&-2 AB AG IA: CPT

## 2024-05-15 PROCEDURE — 76811 OB US DETAILED SNGL FETUS: CPT | Performed by: OBSTETRICS & GYNECOLOGY

## 2024-05-15 PROCEDURE — 86787 VARICELLA-ZOSTER ANTIBODY: CPT

## 2024-05-15 PROCEDURE — 86317 IMMUNOASSAY INFECTIOUS AGENT: CPT

## 2024-05-15 PROCEDURE — 84439 ASSAY OF FREE THYROXINE: CPT

## 2024-05-15 PROCEDURE — 76811 OB US DETAILED SNGL FETUS: CPT

## 2024-05-15 PROCEDURE — 86800 THYROGLOBULIN ANTIBODY: CPT

## 2024-05-15 PROCEDURE — 84432 ASSAY OF THYROGLOBULIN: CPT

## 2024-05-15 PROCEDURE — 85027 COMPLETE CBC AUTOMATED: CPT

## 2024-05-15 PROCEDURE — 36415 COLL VENOUS BLD VENIPUNCTURE: CPT

## 2024-05-15 PROCEDURE — 87340 HEPATITIS B SURFACE AG IA: CPT

## 2024-05-15 PROCEDURE — 83021 HEMOGLOBIN CHROMOTOGRAPHY: CPT

## 2024-05-15 PROCEDURE — 86780 TREPONEMA PALLIDUM: CPT

## 2024-05-15 PROCEDURE — 86803 HEPATITIS C AB TEST: CPT

## 2024-05-15 PROCEDURE — 84443 ASSAY THYROID STIM HORMONE: CPT

## 2024-05-16 LAB
BILL ONLY-THYROGLOBULIN: NORMAL
HEMOGLOBIN A2: 2.7 % (ref 2–3.5)
HEMOGLOBIN A: 97.1 % (ref 95.8–98)
HEMOGLOBIN F: 0.2 % (ref 0–2)
HEMOGLOBIN IDENTIFICATION INTERPRETATION: NORMAL
PATH REVIEW-HGB IDENTIFICATION: NORMAL
THYROGLOB AB SERPL-ACNC: <0.9 IU/ML (ref 0–4)
THYROGLOB SERPL-MCNC: 3.7 NG/ML (ref 1.3–31.8)
THYROGLOB SERPL-MCNC: NORMAL NG/ML (ref 1.3–31.8)

## 2024-05-20 ENCOUNTER — HOSPITAL ENCOUNTER (EMERGENCY)
Facility: HOSPITAL | Age: 39
Discharge: HOME | End: 2024-05-20
Payer: COMMERCIAL

## 2024-05-20 VITALS
RESPIRATION RATE: 15 BRPM | SYSTOLIC BLOOD PRESSURE: 123 MMHG | HEART RATE: 68 BPM | OXYGEN SATURATION: 97 % | TEMPERATURE: 98.4 F | WEIGHT: 173 LBS | HEIGHT: 68 IN | BODY MASS INDEX: 26.22 KG/M2 | DIASTOLIC BLOOD PRESSURE: 75 MMHG

## 2024-05-20 DIAGNOSIS — S01.81XA FOREHEAD LACERATION, INITIAL ENCOUNTER: Primary | ICD-10-CM

## 2024-05-20 PROCEDURE — 99281 EMR DPT VST MAYX REQ PHY/QHP: CPT

## 2024-05-20 ASSESSMENT — COLUMBIA-SUICIDE SEVERITY RATING SCALE - C-SSRS
1. IN THE PAST MONTH, HAVE YOU WISHED YOU WERE DEAD OR WISHED YOU COULD GO TO SLEEP AND NOT WAKE UP?: NO
6. HAVE YOU EVER DONE ANYTHING, STARTED TO DO ANYTHING, OR PREPARED TO DO ANYTHING TO END YOUR LIFE?: NO
2. HAVE YOU ACTUALLY HAD ANY THOUGHTS OF KILLING YOURSELF?: NO

## 2024-05-20 NOTE — ED PROVIDER NOTES
HPI   Chief Complaint   Patient presents with    Head Laceration       38-year-old female presents today with a slight laceration to her medial aspect of her forehead after she fell over a baby furniture.  She denies loss of consciousness.  She denies nausea or vomiting.  She denies epistaxis.  She denies pharyngitis.  She denies posterior neck, thoracic, or lumbar pain.  She denies chest pain, dyspnea, abdominal pain, nausea or vomiting.  She has no other cause for concern or complaint at this time.  She has no allergies to medication and she is current on her tetanus.  She is presently 24 weeks pregnant and she did not strike her abdomen and she is not experiencing any vaginal bleeding.      History provided by:  Patient   used: No                        Saad Coma Scale Score: 15                     Patient History   Past Medical History:   Diagnosis Date    Acute candidiasis of vulva and vagina 01/19/2017    Vaginal candidiasis    Acute maxillary sinusitis, unspecified 11/05/2022    Sinusitis, acute, maxillary    Acute vaginitis 09/28/2020    Bacterial vaginitis    Cervical disc disorder, unspecified, unspecified cervical region 06/30/2020    Disorder of intervertebral disc of cervical spine    Cutaneous abscess of unspecified foot 04/25/2014    Toe abscess    Dermatitis, unspecified 04/13/2017    Eczema of left hand    Displaced fracture of shaft of left clavicle, initial encounter for closed fracture 10/11/2021    Closed displaced fracture of shaft of left clavicle, initial encounter    Dyshidrosis (pompholyx) 04/08/2019    Dyshidrosis    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    Iliotibial band syndrome, right leg 09/11/2019    Iliotibial band syndrome of right side    Local infection of the skin and subcutaneous tissue, unspecified 04/25/2014    Toe infection    Localized swelling, mass and lump, head 10/14/2021    Localized swelling, mass and lump, head    Otalgia, right ear  02/01/2017    Chronic right ear pain    Other chest pain 01/15/2018    Chest pain, localized    Other intervertebral disc displacement, lumbar region 10/20/2021    Lumbar disc herniation    Other specified symptoms and signs involving the circulatory and respiratory systems 10/11/2016    Globus sensation    Other sprain of right hip, initial encounter 05/05/2020    Tear of right acetabular labrum, initial encounter    Other symptoms and signs involving the musculoskeletal system 09/27/2021    Weakness of right hip    Pain in left finger(s) 03/20/2018    Thumb pain, left    Pain in right hip 01/26/2021    Hip pain, right    Pain in unspecified knee     Joint pain, knee    Pelvic and perineal pain 03/23/2020    Pain, pelvic, female    Personal history of diseases of the skin and subcutaneous tissue 11/14/2019    History of pustular acne    Personal history of other benign neoplasm 01/26/2021    History of other benign neoplasm    Personal history of other diseases of the digestive system     History of hemorrhoids    Personal history of other diseases of the female genital tract 06/30/2020    History of dysfunctional uterine bleeding    Personal history of other diseases of the musculoskeletal system and connective tissue 08/14/2013    History of low back pain    Personal history of other diseases of the respiratory system 04/13/2017    History of acute bronchitis    Personal history of other endocrine, nutritional and metabolic disease 03/23/2020    History of thyroid nodule    Personal history of other endocrine, nutritional and metabolic disease 03/09/2020    History of hyperthyroidism    Personal history of other infectious and parasitic diseases 04/08/2019    History of traveler's diarrhea    Rash and other nonspecific skin eruption 02/09/2021    Rash    Right upper quadrant pain 12/23/2017    Colicky right upper quadrant pain    Segmental and somatic dysfunction of pelvic region 01/30/2020    Segmental and  somatic dysfunction of pelvic region    Sprain of metacarpophalangeal joint of left thumb, initial encounter 03/25/2018    Sprain of metacarpophalangeal (MCP) joint of left thumb, initial encounter    Strain of muscle, fascia and tendon of right hip, initial encounter 05/27/2020    Tear of right gluteus medius tendon    Thyrotoxicosis, unspecified without thyrotoxic crisis or storm 08/25/2022    Subclinical hyperthyroidism    Trochanteric bursitis, right hip 05/27/2020    Trochanteric bursitis of right hip    Unspecified injury of left wrist, hand and finger(s), subsequent encounter 05/08/2018    Injury of left thumb, subsequent encounter    Urinary tract infection, site not specified 08/19/2021    Acute UTI     Past Surgical History:   Procedure Laterality Date    OTHER SURGICAL HISTORY  10/07/2016    Dental Surgery    OTHER SURGICAL HISTORY  09/20/2019    [place] tooth extraction    OTHER SURGICAL HISTORY  09/20/2019    Varicose vein ligation    OTHER SURGICAL HISTORY  01/11/2014    Vaginal Pap smear    SINUS SURGERY  10/07/2016    Sinus Surgery     Family History   Problem Relation Name Age of Onset    Heart disease Mother      Heart attack Mother          acute in her early 50's    Heart failure Mother          in her early 50's    Alcohol abuse Father      Basal cell carcinoma Father      Suicide Attempts Father      Obesity Father      Alcohol abuse Brother      Other (concentration deficit) Brother      Diabetes Mother's Sister      Diabetes Mother's Brother      Breast cancer Other Paternal cousin     Lung cancer Other Grandmother     Skin cancer Other Grandmother      Social History     Tobacco Use    Smoking status: Never    Smokeless tobacco: Never   Vaping Use    Vaping status: Never Used   Substance Use Topics    Alcohol use: Never    Drug use: Never       Physical Exam   ED Triage Vitals [05/20/24 1707]   Temperature Heart Rate Respirations BP   36.9 °C (98.4 °F) 68 15 123/75      Pulse Ox Temp src  Heart Rate Source Patient Position   97 % -- -- --      BP Location FiO2 (%)     -- --       Physical Exam  Constitutional:       Appearance: Normal appearance.   HENT:      Head: Normocephalic and atraumatic.      Right Ear: Tympanic membrane normal.      Left Ear: Tympanic membrane normal.      Nose: Nose normal.      Mouth/Throat:      Mouth: Mucous membranes are moist.   Eyes:      Extraocular Movements: Extraocular movements intact.      Pupils: Pupils are equal, round, and reactive to light.   Cardiovascular:      Rate and Rhythm: Normal rate and regular rhythm.      Pulses: Normal pulses.      Heart sounds: Normal heart sounds.   Pulmonary:      Effort: Pulmonary effort is normal.      Breath sounds: Normal breath sounds.   Abdominal:      General: Abdomen is flat.      Palpations: Abdomen is soft.   Musculoskeletal:         General: Normal range of motion.      Cervical back: Normal range of motion.   Skin:     General: Skin is warm.      Capillary Refill: Capillary refill takes less than 2 seconds.   Neurological:      General: No focal deficit present.      Mental Status: She is alert and oriented to person, place, and time.   Psychiatric:         Mood and Affect: Mood normal.         Behavior: Behavior normal.         ED Course & MDM   Diagnoses as of 05/20/24 1747   Forehead laceration, initial encounter       Medical Decision Making  Patient only takes a daily aspirin.  There was no neurologic deficit.  Extraocular motion was intact.  There was no vision change.  The laceration was thoroughly irrigated with normal saline and then glued without any complications.  She did not need a tetanus and she was denying any pain.  Pupils were PERRL.  She was safely discharged home with careful return precautions.  If she develops acute nausea or vomiting, confusion, or the worst headache of her life she should return to the emergency department.        Procedure  Procedures     Jamie Kelsey, JAMAL-JEAN  05/20/24  174

## 2024-05-20 NOTE — ED TRIAGE NOTES
Patient ambulatory to ED after tripping and hitting head head on a wooden chair. Small laceration above R eyebrow, bleeding controlled. Patient is 24 weeks gestation with second pregnancy and takes daily ASA 81mg. Patient denies ANY OB complaints including hitting her stomach, cramping, or bleeding.

## 2024-06-04 DIAGNOSIS — F90.0 ADHD, PREDOMINANTLY INATTENTIVE TYPE: ICD-10-CM

## 2024-06-04 RX ORDER — DEXTROAMPHETAMINE SULFATE, DEXTROAMPHETAMINE SACCHARATE, AMPHETAMINE SULFATE AND AMPHETAMINE ASPARTATE 5; 5; 5; 5 MG/1; MG/1; MG/1; MG/1
20 CAPSULE, EXTENDED RELEASE ORAL EVERY MORNING
Qty: 30 CAPSULE | Refills: 0 | Status: SHIPPED | OUTPATIENT
Start: 2024-06-04

## 2024-06-10 ENCOUNTER — INITIAL PRENATAL (OUTPATIENT)
Dept: OBSTETRICS AND GYNECOLOGY | Facility: CLINIC | Age: 39
End: 2024-06-10
Payer: COMMERCIAL

## 2024-06-10 VITALS — DIASTOLIC BLOOD PRESSURE: 77 MMHG | WEIGHT: 183 LBS | BODY MASS INDEX: 27.83 KG/M2 | SYSTOLIC BLOOD PRESSURE: 123 MMHG

## 2024-06-10 DIAGNOSIS — Q21.12 PATENT FORAMEN OVALE (HHS-HCC): ICD-10-CM

## 2024-06-10 DIAGNOSIS — Z34.80 SUPERVISION OF OTHER NORMAL PREGNANCY, ANTEPARTUM (HHS-HCC): Primary | ICD-10-CM

## 2024-06-10 DIAGNOSIS — F90.0 ATTENTION DEFICIT HYPERACTIVITY DISORDER (ADHD), PREDOMINANTLY INATTENTIVE TYPE: ICD-10-CM

## 2024-06-10 DIAGNOSIS — Z79.899 LONG-TERM CURRENT USE OF STIMULANT: ICD-10-CM

## 2024-06-10 PROCEDURE — 0500F INITIAL PRENATAL CARE VISIT: CPT | Performed by: ADVANCED PRACTICE MIDWIFE

## 2024-06-10 ASSESSMENT — EDINBURGH POSTNATAL DEPRESSION SCALE (EPDS)
TOTAL SCORE: 0
I HAVE BEEN ANXIOUS OR WORRIED FOR NO GOOD REASON: NO, NOT AT ALL
THINGS HAVE BEEN GETTING ON TOP OF ME: NO, I HAVE BEEN COPING AS WELL AS EVER
I HAVE BLAMED MYSELF UNNECESSARILY WHEN THINGS WENT WRONG: NO, NEVER
I HAVE BEEN SO UNHAPPY THAT I HAVE HAD DIFFICULTY SLEEPING: NOT AT ALL
I HAVE BEEN ABLE TO LAUGH AND SEE THE FUNNY SIDE OF THINGS: AS MUCH AS I ALWAYS COULD
I HAVE BEEN SO UNHAPPY THAT I HAVE BEEN CRYING: NO, NEVER
I HAVE FELT SCARED OR PANICKY FOR NO GOOD REASON: NO, NOT AT ALL
I HAVE FELT SAD OR MISERABLE: NO, NOT AT ALL
I HAVE LOOKED FORWARD WITH ENJOYMENT TO THINGS: AS MUCH AS I EVER DID
THE THOUGHT OF HARMING MYSELF HAS OCCURRED TO ME: NEVER

## 2024-06-10 NOTE — PATIENT INSTRUCTIONS
When to call near your due date:    If you are fairly certain that you are in labor or if your water breaks, you can go directly to Labor and Delivery at either [facility] or [facility].    If you desire midwifery care during labor, we always have a Certified Nurse Midwife on call at the hospital 24/7.    Please call if you have questions regarding any of the following:    During office hours or after hours please call the office:      [phone]   Your call will go through to the answering service who will page the appropriate provider on L&D to call you back. Please tell them:  Where you plan to give birth ([facility] or [facility])  Whether you see a physician or midwife for prenatal care  If you do not receive a callback within 20 minutes, please call the answering service back and let them know that you need them to page again.       LABOR:  Contractions that you can not walk or talk though, that are 5 minutes apart, lasting a minute and this has been happening for at least an hour.  If you have had a baby before, you should come in to the hospital when your contractions are 6-7 minutes apart and are strong enough that you can not walk or talk through them.     BLEEDING:   Blood tinged mucous discharge is not uncommon, especially if you have had an exam in the office recently or sexual intercourse.  However if you are bleeding like a period, you should call us.    WATER BREAKS:  You may experience in increase in vaginal secretions near the time of labor, but if you think your water broke, please call us or go to your intended birthing location-- [facility] or [facility] ([facility]).    DECREASED FETAL MOVEMENT:   Baby movements change in the last trimester.  They tend to be not as vigorous, and change to nudges and rolls.  But, if you feel like baby is moving less than is normal for them, please call or come in for evaluation.
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
## 2024-06-11 ENCOUNTER — APPOINTMENT (OUTPATIENT)
Dept: ENDOCRINOLOGY | Facility: CLINIC | Age: 39
End: 2024-06-11
Payer: COMMERCIAL

## 2024-06-11 VITALS
BODY MASS INDEX: 28.28 KG/M2 | SYSTOLIC BLOOD PRESSURE: 120 MMHG | HEART RATE: 76 BPM | DIASTOLIC BLOOD PRESSURE: 70 MMHG | WEIGHT: 186.6 LBS | HEIGHT: 68 IN | RESPIRATION RATE: 16 BRPM

## 2024-06-11 DIAGNOSIS — E04.2 MULTIPLE THYROID NODULES: ICD-10-CM

## 2024-06-11 DIAGNOSIS — C73 PAPILLARY CARCINOMA OF THYROID (MULTI): Primary | ICD-10-CM

## 2024-06-11 PROCEDURE — 99213 OFFICE O/P EST LOW 20 MIN: CPT | Performed by: INTERNAL MEDICINE

## 2024-06-11 PROCEDURE — 1036F TOBACCO NON-USER: CPT | Performed by: INTERNAL MEDICINE

## 2024-06-11 ASSESSMENT — ENCOUNTER SYMPTOMS
VOMITING: 0
CHILLS: 0
NAUSEA: 0
FEVER: 0
DIARRHEA: 0
SHORTNESS OF BREATH: 0
FATIGUE: 0
COUGH: 0
HEADACHES: 0
PALPITATIONS: 0

## 2024-06-11 NOTE — PROGRESS NOTES
Endocrinology: Follow up visit  Subjective   Patient ID: Lashaun Cruz is a 39 y.o. female who presents for Thyroid Cancer.    PCP: Jackie Powers MD    HPI  Since last visit doing well.  Currently 28 wks pregnant.  Son Vasile is doing well.   Feeling well.    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   Respiratory:  Negative for cough and shortness of breath.    Cardiovascular:  Negative for chest pain and palpitations.   Gastrointestinal:  Negative for diarrhea, nausea and vomiting.   Neurological:  Negative for headaches.       Patient Active Problem List   Diagnosis    Attention deficit hyperactivity disorder (ADHD), predominantly inattentive type    Yeast infection    Vocal cord paresis    Disorder of vocal cord    Vitamin D deficiency    Selective deficiency of IgG (Multi)    Papillary carcinoma of thyroid (Multi)    Painful orthopaedic hardware (CMS-HCC)    Hypoxia    Multiple thyroid nodules    Lumbosacral radiculopathy at L4    Long term current use of cannabis    Localized swelling, mass or lump of neck    Left shoulder pain    Hoarseness of voice    High risk HPV infection    Generalized muscle weakness    Acute maxillary sinusitis    Change in voice    Coughing    Decreased range of motion of shoulder    Dyshidrosis (pompholyx)    Dysphonia    Fracture of left clavicle with routine healing    Injury of female perineum    Chronic pain    Decreased range of motion of left shoulder    Long-term current use of stimulant    Hypothyroid    Tendinitis of left wrist    Patent foramen ovale ([facility])    History of abnormal cervical Pap smear        Home Meds:  Current Outpatient Medications   Medication Instructions    Adderall XR 20 mg 24 hr capsule 20 mg, oral, Every morning    albuterol 90 mcg/actuation inhaler 2 puffs, inhalation, EVERY 4-6  HOURS AS NEEDED    azelastine (Astelin) 137 mcg (0.1 %) nasal spray 1 spray, Each Nostril, 2 times daily, Use in each nostril as directed    diphenhydrAMINE (BENADRYL ALLERGY) 25 mg, oral    fexofenadine (ALLEGRA) 180 mg, oral, Daily    fluticasone (Flonase Sensimist) 27.5 mcg/actuation nasal spray 2 sprays, Each Nostril, Daily RT    fluticasone propion-salmeteroL (Advair Diskus) 250-50 mcg/dose diskus inhaler 1 puff, inhalation, 2 times daily RT    hydrocortisone 2.5 % cream Topical    prenatal vitamin, iron-folic, (Prenatal Vitamin) 27 mg iron-800 mcg folic acid tablet oral    SUMAtriptan (IMITREX) 100 mg, oral, Once as needed    tretinoin (Retin-A) 0.05 % cream Topical, Nightly        Allergies   Allergen Reactions    Adhesive Tape-Silicones Rash     paper ok.adhesive and bandaids cause skin reaction    Amoxicillin-Pot Clavulanate Rash    Cefdinir Rash    Ciprofloxacin Rash    House Dust Rash    Levofloxacin Rash    Penicillins Rash        Objective   Vitals:    06/11/24 1111   BP: 120/70   Pulse: 76   Resp: 16      Vitals:    06/11/24 1111   Weight: 84.6 kg (186 lb 9.6 oz)      Body mass index is 28.37 kg/m².   Physical Exam  Constitutional:       Appearance: Normal appearance. She is overweight.   HENT:      Head: Normocephalic and atraumatic.   Neck:      Thyroid: No thyroid mass, thyromegaly or thyroid tenderness.      Comments: Small remaining thyroid gland  Cardiovascular:      Rate and Rhythm: Normal rate and regular rhythm.      Heart sounds: No murmur heard.     No gallop.   Pulmonary:      Effort: Pulmonary effort is normal.      Breath sounds: Normal breath sounds.   Abdominal:      Palpations: Abdomen is soft.      Comments: benign   Neurological:      General: No focal deficit present.      Mental Status: She is alert and oriented to person, place, and time.      Deep Tendon Reflexes: Reflexes are normal and symmetric.   Psychiatric:         Behavior: Behavior is cooperative.         Labs:  Lab  Results   Component Value Date    HGBA1C 4.7 07/11/2022    TSH 1.50 05/15/2024    FREET4 0.67 05/15/2024      Lab Results   Component Value Date    THYROIDPAB 46 03/16/2020    TSI <1.0 03/16/2020        Assessment/Plan   Problem List Items Addressed This Visit       Papillary carcinoma of thyroid (Multi) - Primary    Multiple thyroid nodules     Discussed labs that look good and are stable  Recommend ultrasound when able  Follow up in one year      Electronically signed by:  Shauna Aranda MD 06/11/24 11:44 AM

## 2024-06-14 ENCOUNTER — LAB (OUTPATIENT)
Dept: LAB | Facility: LAB | Age: 39
End: 2024-06-14
Payer: COMMERCIAL

## 2024-06-14 DIAGNOSIS — Z34.80 SUPERVISION OF OTHER NORMAL PREGNANCY, ANTEPARTUM (HHS-HCC): ICD-10-CM

## 2024-06-14 PROBLEM — R09.02 HYPOXIA: Status: RESOLVED | Noted: 2023-05-16 | Resolved: 2024-06-14

## 2024-06-14 PROBLEM — S39.94XA: Status: RESOLVED | Noted: 2023-04-26 | Resolved: 2024-06-14

## 2024-06-14 PROBLEM — R05.9 COUGHING: Status: RESOLVED | Noted: 2023-10-04 | Resolved: 2024-06-14

## 2024-06-14 LAB
ERYTHROCYTE [DISTWIDTH] IN BLOOD BY AUTOMATED COUNT: 12.5 % (ref 11.5–14.5)
GLUCOSE 1H P 50 G GLC PO SERPL-MCNC: 125 MG/DL
HCT VFR BLD AUTO: 32.3 % (ref 36–46)
HGB BLD-MCNC: 11.3 G/DL (ref 12–16)
MCH RBC QN AUTO: 32 PG (ref 26–34)
MCHC RBC AUTO-ENTMCNC: 35 G/DL (ref 32–36)
MCV RBC AUTO: 92 FL (ref 80–100)
NRBC BLD-RTO: 0 /100 WBCS (ref 0–0)
PLATELET # BLD AUTO: 254 X10*3/UL (ref 150–450)
RBC # BLD AUTO: 3.53 X10*6/UL (ref 4–5.2)
WBC # BLD AUTO: 13.9 X10*3/UL (ref 4.4–11.3)

## 2024-06-14 PROCEDURE — 82947 ASSAY GLUCOSE BLOOD QUANT: CPT

## 2024-06-14 PROCEDURE — 85027 COMPLETE CBC AUTOMATED: CPT

## 2024-06-14 PROCEDURE — 36415 COLL VENOUS BLD VENIPUNCTURE: CPT

## 2024-06-15 LAB — REFLEX ADDED, ANEMIA PANEL: NORMAL

## 2024-07-02 ENCOUNTER — APPOINTMENT (OUTPATIENT)
Dept: OBSTETRICS AND GYNECOLOGY | Facility: CLINIC | Age: 39
End: 2024-07-02
Payer: COMMERCIAL

## 2024-07-02 VITALS — BODY MASS INDEX: 28.43 KG/M2 | WEIGHT: 187 LBS | SYSTOLIC BLOOD PRESSURE: 113 MMHG | DIASTOLIC BLOOD PRESSURE: 68 MMHG

## 2024-07-02 DIAGNOSIS — Z79.899 LONG-TERM CURRENT USE OF STIMULANT: ICD-10-CM

## 2024-07-02 DIAGNOSIS — C73 PAPILLARY CARCINOMA OF THYROID (MULTI): ICD-10-CM

## 2024-07-02 DIAGNOSIS — Q21.12 PATENT FORAMEN OVALE (HHS-HCC): ICD-10-CM

## 2024-07-02 DIAGNOSIS — F90.0 ATTENTION DEFICIT HYPERACTIVITY DISORDER (ADHD), PREDOMINANTLY INATTENTIVE TYPE: ICD-10-CM

## 2024-07-02 PROBLEM — Z87.59 HISTORY OF PRECIPITOUS LABOR AND DELIVERY: Status: ACTIVE | Noted: 2024-07-02

## 2024-07-02 PROCEDURE — 0501F PRENATAL FLOW SHEET: CPT | Performed by: ADVANCED PRACTICE MIDWIFE

## 2024-07-02 NOTE — ASSESSMENT & PLAN NOTE
Is taking a daily asa per recommendation.   Referral placed to cards and order for echo placed as well. Patient is amenable to this plan today.

## 2024-07-02 NOTE — PROGRESS NOTES
PLAN:  History of abnormal cervical Pap smear  Plan for pp pap    Attention deficit hyperactivity disorder (ADHD), predominantly inattentive type  Takes adderral 20 xr daily.     Patent foramen ovale ([facility])  Is taking a daily asa per recommendation.   Referral placed to cards and order for echo placed as well. Patient is amenable to this plan today.     Long-term current use of stimulant  Plan for 32 week growth US. Ordered.     ASSESSESMENT:  1. Attention deficit hyperactivity disorder (ADHD), predominantly inattentive type  US MAC OB imaging order      2. Papillary carcinoma of thyroid (Multi)        3. Long-term current use of stimulant        4. Patent foramen ovale ([facility]-HCC)  Referral to Cardiology          She is here for 30w1d OB visit.  Denies cramping, leaking of fluid, or bleeding   Baby is moving well    28 week labs reviewed and WNL rh pos    Declining tdap today      PHYSICAL EXAM:   /68   Wt 84.8 kg (187 lb)   LMP 12/04/2023   BMI 28.43 kg/m²   I have reviewed her pertinent history, lab results, medications and problem list.  See Pregnancy episode for any changes.  Well appearing, Alert & oriented  Skin warm & dry  Normal range of motion in all extremities.    Abdomen soft  nontender  Normal resp effort    SHONNA Andersen   07/02/24    Follow up in about 2 weeks (around 7/16/2024) for ALTON.

## 2024-07-02 NOTE — PATIENT INSTRUCTIONS
THIRD TRIMESTER - GETTING READY FOR BABY    Choose a pediatrician or primary care practice for your baby - When you are admitted to the hospital, you will need to name a person or a practice for them to call when your baby is born to take care of the baby.    Take classes - childbirth classes, breastfeeding class, baby care, infant CPR etc.  You can sign up for these classes on the     Get your TDaP vaccine booster - The CDC recommends every pregnant woman receive the TDaP (whooping cough) vaccine during every pregnancy between 27 and 36 weeks.  We normally have this in stock at the office and can administer it there for you.  Anyone that is going to have close contact with the baby should also get this vaccine.    Get the flu vaccine - If it is flu season.  If you have questions about it, ask us. YES is safe!    Pre register at the hospital - The easiest way is to go online at     Take a tour - You can sign up for a tour using the     If you have had a history of depression - please consider scheduling some time with your counselor or talking to us about helping you find a counselor.  Your risk for having post partum depression is higher than if you did not have a history of depression.  We want to help.    ==========================================================================================================    Childbirth Education Classes     Available Classes:    Childbirth Education  Baby Care Class  CPR Class  Breastfeeding Class  Grandparents Class  Maternity Tour:    [place] Online:    Website:    [email or web address]    How to contact us:    During office hours or after hours please call the office:      [phone]     Please call if you experience any of the following:     LABOR:  Contractions that you feel 4-6 times per hour.  It may feel like lower abdominal cramping, or low back pain that comes and goes with stomach  tightening.  If you notice this, lie down on your side and drink 2 bottles of water.  Rest and fluids is normally enough to calm things down.  If after 1-2 hours the contractions don't get better, then call us.     BLEEDING:   Blood tinged mucous discharge is not uncommon, especially if you have had an exam in the office recently or sexual intercourse.  However if you are bleeding like a period, you should call us    WATER BREAKS:  You may experience in increase in vaginal secretions near the time of labor, but if you think your water broke, please call us.    DECREASED FETAL MOVEMENT:   Baby movements change in the last trimester.  They tend to be not as vigorous, and change to nudges and rolls.  But, if you feel like these movement are not normal, please call our office during office hours or the answering service after hours.        Immediate Post Partum Birth Control Options     Before you go home from the hospital, it is a good idea to have a plan for contraception.  The [facility] mandates that you be offered placement of a Long Activing Reversible Contraception (LARC)  method during your hospital stay.   [facility] will offer you the option of having a LARC placed after your delivery and before you are discharged.   Long active reversible contraceptive choices include the Mirena IUD, Paragard IUD or a hormonal implant called Nexplenon implant.    The following serves as educational material so you can better understand your options    A contraceptive is a way to prevent you from becoming pregnant.    Contraception can be:  A practice like abstinence (not having intercourse).  A barrier like a condom or diaphragm to keep sperm from reaching the egg.  Prescription hormones (the pill, shot, patch or vaginal ring) that keep a woman from releasing an egg each month.  A device  Like an intrauterine contraceptive (IUD) that thickens protective mucus and prevents sperm from reaching the egg.  Some IUDs also  "contain hormones.  Surgery to permanently prevent release of spern (vasectomy) or to prevent sperm from reahing the egg (tubal ligation).    No method of birth control, except abstinence, is 100% effective against pregnancy or chato sexually trasmitted infection, including the Human Immunodeficiency Virus (HIV)    Intrauterine Contraceptives - 2 choices  Paragard Copper T IUD which lasts for 10 years and contains copper  Mirena IUD which contains a hormone called Levonorgestrel and lasts 5 years.  In addition to contraception, a hormonal IUD may decrease bleeding  And cramping during your menstrual cycle over time  Intrauterine contraceptives are 98-99% effective a preventing pregnancy.  They can be inserted shortly aft the delivery of the baby's placenta.  Risks of IUD  Uterine cramping pushes the device out of the uterus called \"expulsion\").  This is more common when the IUD is placed just after delivery (24% versus 10% if placed later).  If this happens you could become pregnant or need the IUD replaced  Puncturing the uterus (called perforation) rarely occurs, but sometimes surgery is needed to remove the IUD  Accidental pregnancy (less than 1%).  If this occurs it could result in an increased risk for ectopic or tubal prenancy    Some women complain of more cramping with the IUD, but this usually decreased over time.  Your doctor or midwife may recommend one device other the other based on your medical history.    Hormonal Implant  Nexplenon Implant lasts for 3 years and contains a hormone called Etonogestrel and is the most reliable non-surgical contraceptive  Nexplanon may also help with painful periods.  The hormonal mplant is 99% effective and even more effective than surgical methods.  The implant is inserted under the skin of your upper arm before you go home.  Risks of Hormonal Implants  May decrease your milk supply when breastfeeding if inserted prior to 4 weeks post partum  May cause irrgular " bleeding or spotting, or periods may stop  Hormonal methods of contracetpion may increase your risk of blood clots, especially if you smoke.  Your provider will discuss the best methods based on your medical history.    =========================================================================================================================================  What is group B streptococcus (GBS)?    Group B streptococcus is one of the many types of bacteria that live in the body and usually do not cause serious illness. It is found in the digestive, urinary, and reproductive tracts of men and women. In women, it can be found in the vagina and rectum. GBS is not a sexually transmitted infection (STI). Also, although the names are similar, GBS is different from  group A streptococcus, the bacteria that causes “strep throat.”    Why is GBS a concern for pregnant women?    Most pregnant women who are colonized with GBS have no symptoms or health effects. In a small number of women, GBS can cause infections of the uterus and urinary tract. A woman who is colonized with GBS late in her pregnancy can pass it to her baby during labor.    What are the types of GBS infection in newborns?    There are two types of GBS infections in newborns: 1) early-onset infections and 2) late-onset infections. Both types of infections can be serious.     What are early-onset GBS infections?    Early-onset infections occur during the first week of life, generally within the first 24-48 hours after birth. These infections can occur as the baby moves through the birth canal of a woman who is colonized with GBS. Only a few babies who are exposed to GBS develop an infection. Certain factors, such as  birth, may increase the risk of a baby becoming infected. The most common problems caused by early-onset GBS infections are lung infections, blood infections, and meningitis.     What are late-onset GBS infections?    These infections occur after  the first 6 days of life. Late-onset infections may be passed from the mother to the baby during birth or they may be caused by contact with other people who are colonized with GBS. Late-onset infection can lead to meningitis and other diseases, such as pneumonia.     Can these infections be prevented in newborns?    GBS testing late in pregnancy and treatment during labor can help prevent early-onset infections. However, it does not prevent late-onset infections. It is important to recognize the signs and symptoms of late-onset GBS infection in your baby:   Slowness or inactivity   Irritability   Poor feeding   Vomiting   High fever  If your baby has any of these signs or symptoms, contact your pediatrician right away.     When are pregnant women tested for GBS?    To help prevent early-onset GBS infection, women are tested for GBS late in pregnancy, between weeks 35 and 37. The test is called a culture. In this test, a swab is used to take a sample from the woman's vagina and rectum. This procedure is quick and not painful. The sample is sent to a lab where it is grown in a special substance.     What if the test results are positive?    If results of the culture test are positive, showing that GBS is present, you most likely will receive treatment with antibiotics during labor to help prevent GBS from being passed to your baby. Antibiotics help get rid of some of the bacteria that can harm the baby during birth. The antibiotics work only if they are given during labor. If treatment is given earlier in pregnancy, the bacteria may regrow and be present during labor. Penicillin is the antibiotic that is most often given to prevent early-onset GBS infection in newborns.     What if I am allergic to penicillin?    If you are allergic to penicillin, tell your health care professional before you are tested for GBS. Women with mild allergic reactions can take an antibiotic called cefazolin. If you have had a severe  reaction to penicillin, such as hives or anaphylaxis, the bacteria in the sample need to be tested to determine the choice of antibiotic.     What if I already had a baby who had a GBS infection?    If you had a previous baby with GBS infection or if your urine has GBS bacteria during this pregnancy, you are at high risk of passing GBS on to your baby during labor and delivery. You will receive treatment during labor to protect your baby from infection. You will not need to be tested between weeks 35 and 37 of pregnancy.     What if I am having a planned  birth?    If you are having a planned  birth, you do not need to receive antibiotics for GBS during delivery if your labor has not begun or the amniotic sac has not ruptured (your water has not broken). However, you should still be tested for GBS because labor may occur before the planned  birth. If your test result is positive, your baby may need to be monitored for GBS infection after birth.    If you have further questions, contact your provider.     Copyright 2017 by the [employer] College [employer]    Please call if you experience any of the following:  During office hours or after hours please call the office:      [phone]      LABOR:  Contractions that you feel 4-6 times per hour.  It may feel like lower abdominal cramping, or low back pain that comes and goes with stomach tightening.  If you notice this, lie down on your side and drink 2 bottles of water.  Rest and fluids is normally enough to calm things down.  If after 1-2 hours the contractions don't get better, then call us.     BLEEDING:   Blood tinged mucous discharge is not uncommon, especially if you have had an exam in the office recently or sexual intercourse.  However if you are bleeding like a period, you should call us    WATER BREAKS:  You may experience in increase in vaginal secretions near the time of labor, but if you  think your water broke, please call us.    DECREASED FETAL MOVEMENT:   Baby movements change in the last trimester.  They tend to be not as vigorous, and change to nudges and rolls.  But, if you feel like these movement are not normal, please call our office during office hours or the answering service after hours.

## 2024-07-03 DIAGNOSIS — F90.0 ADHD, PREDOMINANTLY INATTENTIVE TYPE: ICD-10-CM

## 2024-07-03 PROBLEM — Z98.890 HISTORY OF LOOP ELECTROSURGICAL EXCISION PROCEDURE (LEEP) OF CERVIX AFFECTING PREGNANCY IN THIRD TRIMESTER (HHS-HCC): Status: ACTIVE | Noted: 2024-07-03

## 2024-07-03 PROBLEM — O34.43 HISTORY OF LOOP ELECTROSURGICAL EXCISION PROCEDURE (LEEP) OF CERVIX AFFECTING PREGNANCY IN THIRD TRIMESTER (HHS-HCC): Status: ACTIVE | Noted: 2024-07-03

## 2024-07-03 PROBLEM — O09.529 ANTEPARTUM MULTIGRAVIDA OF ADVANCED MATERNAL AGE (HHS-HCC): Status: ACTIVE | Noted: 2024-07-03

## 2024-07-03 RX ORDER — DEXTROAMPHETAMINE SULFATE, DEXTROAMPHETAMINE SACCHARATE, AMPHETAMINE SULFATE AND AMPHETAMINE ASPARTATE 5; 5; 5; 5 MG/1; MG/1; MG/1; MG/1
20 CAPSULE, EXTENDED RELEASE ORAL EVERY MORNING
Qty: 30 CAPSULE | Refills: 0 | Status: SHIPPED | OUTPATIENT
Start: 2024-07-03

## 2024-07-11 ENCOUNTER — APPOINTMENT (OUTPATIENT)
Dept: RADIOLOGY | Facility: HOSPITAL | Age: 39
End: 2024-07-11
Payer: COMMERCIAL

## 2024-07-11 ENCOUNTER — APPOINTMENT (OUTPATIENT)
Dept: RADIOLOGY | Facility: HOSPITAL | Age: 39
End: 2024-07-11

## 2024-07-16 ENCOUNTER — APPOINTMENT (OUTPATIENT)
Dept: RADIOLOGY | Facility: CLINIC | Age: 39
End: 2024-07-16

## 2024-07-29 ENCOUNTER — ROUTINE PRENATAL (OUTPATIENT)
Dept: OBSTETRICS AND GYNECOLOGY | Facility: CLINIC | Age: 39
End: 2024-07-29
Payer: COMMERCIAL

## 2024-07-29 VITALS — DIASTOLIC BLOOD PRESSURE: 84 MMHG | SYSTOLIC BLOOD PRESSURE: 113 MMHG | WEIGHT: 198 LBS | BODY MASS INDEX: 30.11 KG/M2

## 2024-07-29 DIAGNOSIS — Z34.80 SUPERVISION OF OTHER NORMAL PREGNANCY, ANTEPARTUM (HHS-HCC): ICD-10-CM

## 2024-07-29 DIAGNOSIS — Z3A.33 33 WEEKS GESTATION OF PREGNANCY (HHS-HCC): ICD-10-CM

## 2024-07-29 DIAGNOSIS — Q21.12 PATENT FORAMEN OVALE (HHS-HCC): Primary | ICD-10-CM

## 2024-07-29 PROCEDURE — 0501F PRENATAL FLOW SHEET: CPT | Performed by: ADVANCED PRACTICE MIDWIFE

## 2024-07-29 ASSESSMENT — ENCOUNTER SYMPTOMS
ALLERGIC/IMMUNOLOGIC NEGATIVE: 0
HEMATOLOGIC/LYMPHATIC NEGATIVE: 0
MUSCULOSKELETAL NEGATIVE: 0
GASTROINTESTINAL NEGATIVE: 0
PSYCHIATRIC NEGATIVE: 0
RESPIRATORY NEGATIVE: 0
CARDIOVASCULAR NEGATIVE: 0
ENDOCRINE NEGATIVE: 0
NEUROLOGICAL NEGATIVE: 0
CONSTITUTIONAL NEGATIVE: 0
EYES NEGATIVE: 0

## 2024-07-29 NOTE — PROGRESS NOTES
Subjective   Patient ID [id number]   Lashaun Cruz is a 39 y.o.  at 34w0d with a working estimated date of delivery of 2024, by Ultrasound who presents for a routine prenatal visit. She denies vaginal bleeding, leakage of fluid, decreased fetal movements, or contractions.    Her pregnancy is complicated by:  -patent foramen ovale, cards consult placed, history of positive bubble test   -history of papillary carcinoma of thyroid    Objective   Physical Exam:   Weight: 89.8 kg (198 lb)  Expected Total Weight Gain: 7 kg (15 lb)-11.5 kg (25 lb)   Pregravid BMI: 26.01  BP: 113/84                  Prenatal Labs  Urine Dip:  Lab Results   Component Value Date    KETONESU NEGATIVE 2021     Lab Results   Component Value Date    HGB 11.3 (L) 2024    HCT 32.3 (L) 2024    ABO A 2023    HEPBSAG Nonreactive 05/15/2024     Assessment/Plan     Continue prenatal vitamin.  Declines Tdap today.   Growth US tomorrow as scheduled.   Expected mode of delivery vaginal.   Follow up in 2 week for a routine prenatal visit.  Reviewed patient with Dr. Harrell who recommends another echo this pregnancy and if the same or better results as last pregnancy, no need for MFM consult or change to delivery plan. Discussed this with Lashaun who was in agreement with plan.

## 2024-07-30 ENCOUNTER — TELEPHONE (OUTPATIENT)
Dept: MATERNAL FETAL MEDICINE | Facility: CLINIC | Age: 39
End: 2024-07-30
Payer: COMMERCIAL

## 2024-07-30 ENCOUNTER — HOSPITAL ENCOUNTER (OUTPATIENT)
Dept: RADIOLOGY | Facility: CLINIC | Age: 39
Discharge: HOME | End: 2024-07-30
Payer: COMMERCIAL

## 2024-07-30 DIAGNOSIS — Z3A.21 21 WEEKS GESTATION OF PREGNANCY (HHS-HCC): ICD-10-CM

## 2024-07-30 PROCEDURE — 76816 OB US FOLLOW-UP PER FETUS: CPT

## 2024-07-30 NOTE — TELEPHONE ENCOUNTER
Patient states she does not use tobacco, ultrasound report from today states that she does. Patient advised that I will inform Dr. Mak and ask for correction on ultrasound report.

## 2024-07-30 NOTE — TELEPHONE ENCOUNTER
Patient wanted her chart updated as it stated that she is a smoker on her mychart. She stated that this was not asked of her. Patient would like a return call at [phone]

## 2024-08-02 ENCOUNTER — APPOINTMENT (OUTPATIENT)
Dept: PEDIATRIC CARDIOLOGY | Facility: HOSPITAL | Age: 39
End: 2024-08-02
Payer: COMMERCIAL

## 2024-08-05 ENCOUNTER — HOSPITAL ENCOUNTER (OUTPATIENT)
Dept: RADIOLOGY | Facility: HOSPITAL | Age: 39
Discharge: HOME | End: 2024-08-05
Payer: COMMERCIAL

## 2024-08-05 DIAGNOSIS — C73 PAPILLARY CARCINOMA OF THYROID (MULTI): ICD-10-CM

## 2024-08-05 PROCEDURE — 76536 US EXAM OF HEAD AND NECK: CPT | Performed by: RADIOLOGY

## 2024-08-05 PROCEDURE — 76536 US EXAM OF HEAD AND NECK: CPT

## 2024-08-06 DIAGNOSIS — F90.0 ADHD, PREDOMINANTLY INATTENTIVE TYPE: ICD-10-CM

## 2024-08-06 RX ORDER — DEXTROAMPHETAMINE SULFATE, DEXTROAMPHETAMINE SACCHARATE, AMPHETAMINE SULFATE AND AMPHETAMINE ASPARTATE 5; 5; 5; 5 MG/1; MG/1; MG/1; MG/1
20 CAPSULE, EXTENDED RELEASE ORAL EVERY MORNING
Qty: 30 CAPSULE | Refills: 0 | Status: SHIPPED | OUTPATIENT
Start: 2024-08-06

## 2024-08-09 ENCOUNTER — HOSPITAL ENCOUNTER (OUTPATIENT)
Dept: CARDIOLOGY | Facility: HOSPITAL | Age: 39
Discharge: HOME | End: 2024-08-09
Payer: COMMERCIAL

## 2024-08-09 DIAGNOSIS — Q21.12 PATENT FORAMEN OVALE (HHS-HCC): ICD-10-CM

## 2024-08-09 PROCEDURE — 93303 ECHO TRANSTHORACIC: CPT

## 2024-08-10 LAB
AORTIC VALVE MEAN GRADIENT: 5 MMHG
AORTIC VALVE PEAK VELOCITY: 1.53 M/S
AV PEAK GRADIENT: 9.4 MMHG
AVA (PEAK VEL): 2.29 CM2
AVA (VTI): 2.08 CM2
EJECTION FRACTION APICAL 4 CHAMBER: 53.3
EJECTION FRACTION: 60 %
LEFT ATRIUM VOLUME AREA LENGTH INDEX BSA: 33.4 ML/M2
LEFT VENTRICLE INTERNAL DIMENSION DIASTOLE: 4.33 CM (ref 3.5–6)
LEFT VENTRICULAR OUTFLOW TRACT DIAMETER: 2.1 CM
MITRAL VALVE E/A RATIO: 1.07
RIGHT VENTRICLE FREE WALL PEAK S': 12.1 CM/S
RIGHT VENTRICLE PEAK SYSTOLIC PRESSURE: 18.7 MMHG
TRICUSPID ANNULAR PLANE SYSTOLIC EXCURSION: 2 CM

## 2024-08-12 ENCOUNTER — ROUTINE PRENATAL (OUTPATIENT)
Dept: OBSTETRICS AND GYNECOLOGY | Facility: CLINIC | Age: 39
End: 2024-08-12
Payer: COMMERCIAL

## 2024-08-12 VITALS — BODY MASS INDEX: 30.71 KG/M2 | SYSTOLIC BLOOD PRESSURE: 125 MMHG | DIASTOLIC BLOOD PRESSURE: 77 MMHG | WEIGHT: 202 LBS

## 2024-08-12 DIAGNOSIS — Z34.80 SUPERVISION OF OTHER NORMAL PREGNANCY, ANTEPARTUM (HHS-HCC): ICD-10-CM

## 2024-08-12 DIAGNOSIS — Z3A.36 36 WEEKS GESTATION OF PREGNANCY (HHS-HCC): Primary | ICD-10-CM

## 2024-08-12 PROCEDURE — 87081 CULTURE SCREEN ONLY: CPT | Performed by: ADVANCED PRACTICE MIDWIFE

## 2024-08-12 PROCEDURE — 90715 TDAP VACCINE 7 YRS/> IM: CPT | Performed by: ADVANCED PRACTICE MIDWIFE

## 2024-08-12 PROCEDURE — 0501F PRENATAL FLOW SHEET: CPT | Performed by: ADVANCED PRACTICE MIDWIFE

## 2024-08-12 ASSESSMENT — ENCOUNTER SYMPTOMS
HEMATOLOGIC/LYMPHATIC NEGATIVE: 0
ENDOCRINE NEGATIVE: 0
EYES NEGATIVE: 0
RESPIRATORY NEGATIVE: 0
GASTROINTESTINAL NEGATIVE: 0
CARDIOVASCULAR NEGATIVE: 0
NEUROLOGICAL NEGATIVE: 0
MUSCULOSKELETAL NEGATIVE: 0
CONSTITUTIONAL NEGATIVE: 0
ALLERGIC/IMMUNOLOGIC NEGATIVE: 0
PSYCHIATRIC NEGATIVE: 0

## 2024-08-12 NOTE — PROGRESS NOTES
Subjective   Patient ID [id number]   Lashaun Cruz is a 39 y.o.  at 36w0d with a working estimated date of delivery of 2024, by Ultrasound who presents for a routine prenatal visit. She denies vaginal bleeding, leakage of fluid, decreased fetal movements, or contractions.    Her pregnancy is complicated by:  -h/o echo showing PFO, repeat echo 3rd trimester this pregnancy negative for PFO. Reviewed echo and history with Dr. Dhillon who states no MFM consult necessary, will consider normal echo moving forward.   -history of papillary carcinoma of thyroid    Objective   Physical Exam:   Weight: 91.6 kg (202 lb)  Expected Total Weight Gain: 7 kg (15 lb)-11.5 kg (25 lb)   Pregravid BMI: 26.01  BP: 125/77  Fetal Heart Rate: 152 Fundal Height (cm): 35 cm Presentation: Vertex           Prenatal Labs  Urine Dip:  Lab Results   Component Value Date    KETONESU NEGATIVE 2021     Lab Results   Component Value Date    HGB 11.3 (L) 2024    HCT 32.3 (L) 2024    ABO A 2023    HEPBSAG Nonreactive 05/15/2024     Assessment/Plan     Continue prenatal vitamin.  Tdap and GBS today.    Expected mode of delivery vaginal.   Follow up in 1 week for a routine prenatal visit.  Reviewed history and echo with Dr. Dhillon who states no MFM consult necessary, will consider most recent echo normal moving forward.

## 2024-08-15 LAB — GP B STREP GENITAL QL CULT: NORMAL

## 2024-08-19 ENCOUNTER — ROUTINE PRENATAL (OUTPATIENT)
Dept: OBSTETRICS AND GYNECOLOGY | Facility: CLINIC | Age: 39
End: 2024-08-19
Payer: COMMERCIAL

## 2024-08-19 VITALS — DIASTOLIC BLOOD PRESSURE: 72 MMHG | WEIGHT: 203 LBS | SYSTOLIC BLOOD PRESSURE: 105 MMHG | BODY MASS INDEX: 30.87 KG/M2

## 2024-08-19 DIAGNOSIS — Z34.80 SUPERVISION OF OTHER NORMAL PREGNANCY, ANTEPARTUM (HHS-HCC): ICD-10-CM

## 2024-08-19 DIAGNOSIS — Z3A.37 37 WEEKS GESTATION OF PREGNANCY (HHS-HCC): Primary | ICD-10-CM

## 2024-08-19 PROCEDURE — 0501F PRENATAL FLOW SHEET: CPT | Performed by: ADVANCED PRACTICE MIDWIFE

## 2024-08-19 ASSESSMENT — ENCOUNTER SYMPTOMS
RESPIRATORY NEGATIVE: 0
PSYCHIATRIC NEGATIVE: 0
ALLERGIC/IMMUNOLOGIC NEGATIVE: 0
EYES NEGATIVE: 0
ENDOCRINE NEGATIVE: 0
GASTROINTESTINAL NEGATIVE: 0
NEUROLOGICAL NEGATIVE: 0
CARDIOVASCULAR NEGATIVE: 0
MUSCULOSKELETAL NEGATIVE: 0
HEMATOLOGIC/LYMPHATIC NEGATIVE: 0
CONSTITUTIONAL NEGATIVE: 0

## 2024-08-19 NOTE — PROGRESS NOTES
Subjective   Patient ID [id number]   Lashaun Cruz is a 39 y.o.  at 37w0d with a working estimated date of delivery of 2024, by Ultrasound who presents for a routine prenatal visit. She denies vaginal bleeding, leakage of fluid, decreased fetal movements, or contractions.    Endorses soreness at injection site from her Tdap last visit. States she did not have this reaction in her last pregnancy.     Her pregnancy is complicated by:  -h/o echo showing PFO, repeat echo 3rd trimester this pregnancy negative for PFO. Reviewed echo and history with Dr. Dhillon who states no MFM consult necessary, will consider normal echo moving forward. S/p OBCRM with Dr. Saenz, ok for CNM management, ok for delivery at [facility].   -history of papillary carcinoma of thyroid    Objective   Physical Exam:   Weight: 92.1 kg (203 lb)  Expected Total Weight Gain: 7 kg (15 lb)-11.5 kg (25 lb)   Pregravid BMI: 26.01  BP: 105/72  Fetal Heart Rate: 136 Fundal Height (cm): 37 cm Presentation: Vertex           Prenatal Labs  Urine Dip:  Lab Results   Component Value Date    KETONESU NEGATIVE 2021     Lab Results   Component Value Date    HGB 11.3 (L) 2024    HCT 32.3 (L) 2024    ABO A 2023    HEPBSAG Nonreactive 05/15/2024     Assessment/Plan     Continue prenatal vitamin.  Expected mode of delivery vaginal.   Follow up in 1 week for a routine prenatal visit.  FMCs, labor/srom/bleeding precautions reviewed.

## 2024-09-02 ENCOUNTER — HOSPITAL ENCOUNTER (INPATIENT)
Facility: HOSPITAL | Age: 39
LOS: 1 days | Discharge: HOME | End: 2024-09-03
Attending: OBSTETRICS & GYNECOLOGY | Admitting: STUDENT IN AN ORGANIZED HEALTH CARE EDUCATION/TRAINING PROGRAM
Payer: COMMERCIAL

## 2024-09-02 ENCOUNTER — ANESTHESIA (OUTPATIENT)
Dept: OBSTETRICS AND GYNECOLOGY | Facility: HOSPITAL | Age: 39
End: 2024-09-02
Payer: COMMERCIAL

## 2024-09-02 ENCOUNTER — ANESTHESIA EVENT (OUTPATIENT)
Dept: OBSTETRICS AND GYNECOLOGY | Facility: HOSPITAL | Age: 39
End: 2024-09-02
Payer: COMMERCIAL

## 2024-09-02 DIAGNOSIS — L30.9 CHRONIC ECZEMA OF HAND: ICD-10-CM

## 2024-09-02 PROBLEM — Z3A.39 39 WEEKS GESTATION OF PREGNANCY (HHS-HCC): Status: ACTIVE | Noted: 2024-09-02

## 2024-09-02 PROCEDURE — 1210000001 HC SEMI-PRIVATE ROOM DAILY

## 2024-09-02 PROCEDURE — 59409 OBSTETRICAL CARE: CPT | Performed by: ADVANCED PRACTICE MIDWIFE

## 2024-09-02 PROCEDURE — 7210000002 HC LABOR PER HOUR

## 2024-09-02 PROCEDURE — 2500000001 HC RX 250 WO HCPCS SELF ADMINISTERED DRUGS (ALT 637 FOR MEDICARE OP): Performed by: ADVANCED PRACTICE MIDWIFE

## 2024-09-02 PROCEDURE — 59050 FETAL MONITOR W/REPORT: CPT

## 2024-09-02 PROCEDURE — 2500000004 HC RX 250 GENERAL PHARMACY W/ HCPCS (ALT 636 FOR OP/ED): Performed by: STUDENT IN AN ORGANIZED HEALTH CARE EDUCATION/TRAINING PROGRAM

## 2024-09-02 PROCEDURE — 59410 OBSTETRICAL CARE: CPT | Performed by: ADVANCED PRACTICE MIDWIFE

## 2024-09-02 RX ORDER — METOCLOPRAMIDE 10 MG/1
10 TABLET ORAL EVERY 6 HOURS PRN
Status: DISCONTINUED | OUTPATIENT
Start: 2024-09-02 | End: 2024-09-03

## 2024-09-02 RX ORDER — IBUPROFEN 600 MG/1
600 TABLET ORAL EVERY 6 HOURS
Status: DISCONTINUED | OUTPATIENT
Start: 2024-09-02 | End: 2024-09-04 | Stop reason: HOSPADM

## 2024-09-02 RX ORDER — TRANEXAMIC ACID 100 MG/ML
1000 INJECTION, SOLUTION INTRAVENOUS ONCE AS NEEDED
Status: DISCONTINUED | OUTPATIENT
Start: 2024-09-02 | End: 2024-09-04 | Stop reason: HOSPADM

## 2024-09-02 RX ORDER — MISOPROSTOL 200 UG/1
800 TABLET ORAL ONCE AS NEEDED
Status: DISCONTINUED | OUTPATIENT
Start: 2024-09-02 | End: 2024-09-03

## 2024-09-02 RX ORDER — LABETALOL HYDROCHLORIDE 5 MG/ML
20 INJECTION, SOLUTION INTRAVENOUS ONCE AS NEEDED
Status: DISCONTINUED | OUTPATIENT
Start: 2024-09-02 | End: 2024-09-04 | Stop reason: HOSPADM

## 2024-09-02 RX ORDER — OXYTOCIN/0.9 % SODIUM CHLORIDE 30/500 ML
60 PLASTIC BAG, INJECTION (ML) INTRAVENOUS ONCE AS NEEDED
Status: COMPLETED | OUTPATIENT
Start: 2024-09-02 | End: 2024-09-02

## 2024-09-02 RX ORDER — DIPHENHYDRAMINE HYDROCHLORIDE 50 MG/ML
25 INJECTION INTRAMUSCULAR; INTRAVENOUS EVERY 6 HOURS PRN
Status: DISCONTINUED | OUTPATIENT
Start: 2024-09-02 | End: 2024-09-04 | Stop reason: HOSPADM

## 2024-09-02 RX ORDER — ONDANSETRON 4 MG/1
4 TABLET, FILM COATED ORAL EVERY 6 HOURS PRN
Status: DISCONTINUED | OUTPATIENT
Start: 2024-09-02 | End: 2024-09-03

## 2024-09-02 RX ORDER — ONDANSETRON HYDROCHLORIDE 2 MG/ML
4 INJECTION, SOLUTION INTRAVENOUS EVERY 6 HOURS PRN
Status: DISCONTINUED | OUTPATIENT
Start: 2024-09-02 | End: 2024-09-03

## 2024-09-02 RX ORDER — METHYLERGONOVINE MALEATE 0.2 MG/ML
0.2 INJECTION INTRAVENOUS ONCE AS NEEDED
Status: DISCONTINUED | OUTPATIENT
Start: 2024-09-02 | End: 2024-09-03

## 2024-09-02 RX ORDER — CARBOPROST TROMETHAMINE 250 UG/ML
250 INJECTION, SOLUTION INTRAMUSCULAR ONCE AS NEEDED
Status: DISCONTINUED | OUTPATIENT
Start: 2024-09-02 | End: 2024-09-04 | Stop reason: HOSPADM

## 2024-09-02 RX ORDER — OXYTOCIN 10 [USP'U]/ML
10 INJECTION, SOLUTION INTRAMUSCULAR; INTRAVENOUS ONCE AS NEEDED
Status: DISCONTINUED | OUTPATIENT
Start: 2024-09-02 | End: 2024-09-04 | Stop reason: HOSPADM

## 2024-09-02 RX ORDER — METOCLOPRAMIDE HYDROCHLORIDE 5 MG/ML
10 INJECTION INTRAMUSCULAR; INTRAVENOUS EVERY 6 HOURS PRN
Status: DISCONTINUED | OUTPATIENT
Start: 2024-09-02 | End: 2024-09-03

## 2024-09-02 RX ORDER — POLYETHYLENE GLYCOL 3350 17 G/17G
17 POWDER, FOR SOLUTION ORAL 2 TIMES DAILY PRN
Status: DISCONTINUED | OUTPATIENT
Start: 2024-09-02 | End: 2024-09-04 | Stop reason: HOSPADM

## 2024-09-02 RX ORDER — ENOXAPARIN SODIUM 100 MG/ML
40 INJECTION SUBCUTANEOUS EVERY 24 HOURS
Status: DISCONTINUED | OUTPATIENT
Start: 2024-09-03 | End: 2024-09-04 | Stop reason: HOSPADM

## 2024-09-02 RX ORDER — NIFEDIPINE 10 MG/1
10 CAPSULE ORAL ONCE AS NEEDED
Status: DISCONTINUED | OUTPATIENT
Start: 2024-09-02 | End: 2024-09-03

## 2024-09-02 RX ORDER — OXYTOCIN/0.9 % SODIUM CHLORIDE 30/500 ML
60 PLASTIC BAG, INJECTION (ML) INTRAVENOUS ONCE AS NEEDED
Status: DISCONTINUED | OUTPATIENT
Start: 2024-09-02 | End: 2024-09-04 | Stop reason: HOSPADM

## 2024-09-02 RX ORDER — LOPERAMIDE HYDROCHLORIDE 2 MG/1
4 CAPSULE ORAL EVERY 2 HOUR PRN
Status: DISCONTINUED | OUTPATIENT
Start: 2024-09-02 | End: 2024-09-04 | Stop reason: HOSPADM

## 2024-09-02 RX ORDER — SIMETHICONE 80 MG
80 TABLET,CHEWABLE ORAL 4 TIMES DAILY PRN
Status: DISCONTINUED | OUTPATIENT
Start: 2024-09-02 | End: 2024-09-04 | Stop reason: HOSPADM

## 2024-09-02 RX ORDER — BISACODYL 10 MG/1
10 SUPPOSITORY RECTAL DAILY PRN
Status: DISCONTINUED | OUTPATIENT
Start: 2024-09-02 | End: 2024-09-04 | Stop reason: HOSPADM

## 2024-09-02 RX ORDER — LOPERAMIDE HYDROCHLORIDE 2 MG/1
4 CAPSULE ORAL EVERY 2 HOUR PRN
Status: DISCONTINUED | OUTPATIENT
Start: 2024-09-02 | End: 2024-09-03

## 2024-09-02 RX ORDER — DIPHENHYDRAMINE HCL 25 MG
25 CAPSULE ORAL EVERY 6 HOURS PRN
Status: DISCONTINUED | OUTPATIENT
Start: 2024-09-02 | End: 2024-09-04 | Stop reason: HOSPADM

## 2024-09-02 RX ORDER — HYDRALAZINE HYDROCHLORIDE 20 MG/ML
5 INJECTION INTRAMUSCULAR; INTRAVENOUS ONCE AS NEEDED
Status: DISCONTINUED | OUTPATIENT
Start: 2024-09-02 | End: 2024-09-04 | Stop reason: HOSPADM

## 2024-09-02 RX ORDER — TRANEXAMIC ACID 100 MG/ML
1000 INJECTION, SOLUTION INTRAVENOUS ONCE AS NEEDED
Status: DISCONTINUED | OUTPATIENT
Start: 2024-09-02 | End: 2024-09-03

## 2024-09-02 RX ORDER — MISOPROSTOL 200 UG/1
800 TABLET ORAL ONCE AS NEEDED
Status: DISCONTINUED | OUTPATIENT
Start: 2024-09-02 | End: 2024-09-04 | Stop reason: HOSPADM

## 2024-09-02 RX ORDER — TERBUTALINE SULFATE 1 MG/ML
0.25 INJECTION SUBCUTANEOUS ONCE AS NEEDED
Status: DISCONTINUED | OUTPATIENT
Start: 2024-09-02 | End: 2024-09-03

## 2024-09-02 RX ORDER — ADHESIVE BANDAGE
10 BANDAGE TOPICAL
Status: DISCONTINUED | OUTPATIENT
Start: 2024-09-02 | End: 2024-09-04 | Stop reason: HOSPADM

## 2024-09-02 RX ORDER — ONDANSETRON 4 MG/1
4 TABLET, FILM COATED ORAL EVERY 6 HOURS PRN
Status: DISCONTINUED | OUTPATIENT
Start: 2024-09-02 | End: 2024-09-04 | Stop reason: HOSPADM

## 2024-09-02 RX ORDER — NIFEDIPINE 10 MG/1
10 CAPSULE ORAL ONCE AS NEEDED
Status: DISCONTINUED | OUTPATIENT
Start: 2024-09-02 | End: 2024-09-04 | Stop reason: HOSPADM

## 2024-09-02 RX ORDER — ONDANSETRON HYDROCHLORIDE 2 MG/ML
4 INJECTION, SOLUTION INTRAVENOUS EVERY 6 HOURS PRN
Status: DISCONTINUED | OUTPATIENT
Start: 2024-09-02 | End: 2024-09-04 | Stop reason: HOSPADM

## 2024-09-02 RX ORDER — CARBOPROST TROMETHAMINE 250 UG/ML
250 INJECTION, SOLUTION INTRAMUSCULAR ONCE AS NEEDED
Status: DISCONTINUED | OUTPATIENT
Start: 2024-09-02 | End: 2024-09-03

## 2024-09-02 RX ORDER — ACETAMINOPHEN 325 MG/1
975 TABLET ORAL EVERY 6 HOURS
Status: DISCONTINUED | OUTPATIENT
Start: 2024-09-02 | End: 2024-09-04 | Stop reason: HOSPADM

## 2024-09-02 RX ORDER — SODIUM CHLORIDE, SODIUM LACTATE, POTASSIUM CHLORIDE, CALCIUM CHLORIDE 600; 310; 30; 20 MG/100ML; MG/100ML; MG/100ML; MG/100ML
125 INJECTION, SOLUTION INTRAVENOUS CONTINUOUS
Status: DISCONTINUED | OUTPATIENT
Start: 2024-09-02 | End: 2024-09-03

## 2024-09-02 RX ORDER — LABETALOL HYDROCHLORIDE 5 MG/ML
20 INJECTION, SOLUTION INTRAVENOUS ONCE AS NEEDED
Status: DISCONTINUED | OUTPATIENT
Start: 2024-09-02 | End: 2024-09-03

## 2024-09-02 RX ORDER — METHYLERGONOVINE MALEATE 0.2 MG/ML
0.2 INJECTION INTRAVENOUS ONCE AS NEEDED
Status: DISCONTINUED | OUTPATIENT
Start: 2024-09-02 | End: 2024-09-04 | Stop reason: HOSPADM

## 2024-09-02 RX ORDER — OXYTOCIN 10 [USP'U]/ML
10 INJECTION, SOLUTION INTRAMUSCULAR; INTRAVENOUS ONCE AS NEEDED
Status: DISCONTINUED | OUTPATIENT
Start: 2024-09-02 | End: 2024-09-03

## 2024-09-02 RX ORDER — HYDRALAZINE HYDROCHLORIDE 20 MG/ML
5 INJECTION INTRAMUSCULAR; INTRAVENOUS ONCE AS NEEDED
Status: DISCONTINUED | OUTPATIENT
Start: 2024-09-02 | End: 2024-09-03

## 2024-09-02 RX ORDER — LIDOCAINE HYDROCHLORIDE 10 MG/ML
30 INJECTION INFILTRATION; PERINEURAL ONCE AS NEEDED
Status: DISCONTINUED | OUTPATIENT
Start: 2024-09-02 | End: 2024-09-03

## 2024-09-02 RX ORDER — LIDOCAINE 560 MG/1
1 PATCH PERCUTANEOUS; TOPICAL; TRANSDERMAL
Status: DISCONTINUED | OUTPATIENT
Start: 2024-09-02 | End: 2024-09-04 | Stop reason: HOSPADM

## 2024-09-02 SDOH — SOCIAL STABILITY: SOCIAL INSECURITY: PHYSICAL ABUSE: DENIES

## 2024-09-02 SDOH — HEALTH STABILITY: MENTAL HEALTH: SUICIDAL BEHAVIOR (LIFETIME): NO

## 2024-09-02 SDOH — SOCIAL STABILITY: SOCIAL INSECURITY: DO YOU FEEL ANYONE HAS EXPLOITED OR TAKEN ADVANTAGE OF YOU FINANCIALLY OR OF YOUR PERSONAL PROPERTY?: NO

## 2024-09-02 SDOH — ECONOMIC STABILITY: HOUSING INSECURITY: DO YOU FEEL UNSAFE GOING BACK TO THE PLACE WHERE YOU ARE LIVING?: NO

## 2024-09-02 SDOH — SOCIAL STABILITY: SOCIAL INSECURITY: ARE YOU OR HAVE YOU BEEN THREATENED OR ABUSED PHYSICALLY, EMOTIONALLY, OR SEXUALLY BY ANYONE?: NO

## 2024-09-02 SDOH — SOCIAL STABILITY: SOCIAL INSECURITY: HAVE YOU HAD ANY THOUGHTS OF HARMING ANYONE ELSE?: NO

## 2024-09-02 SDOH — HEALTH STABILITY: MENTAL HEALTH: WISH TO BE DEAD (PAST 1 MONTH): NO

## 2024-09-02 SDOH — SOCIAL STABILITY: SOCIAL INSECURITY: DOES ANYONE TRY TO KEEP YOU FROM HAVING/CONTACTING OTHER FRIENDS OR DOING THINGS OUTSIDE YOUR HOME?: NO

## 2024-09-02 SDOH — SOCIAL STABILITY: SOCIAL INSECURITY: VERBAL ABUSE: DENIES

## 2024-09-02 SDOH — HEALTH STABILITY: MENTAL HEALTH: STRENGTHS (MUST CHOOSE TWO): INDEPENDENT LIVING;DEMONSTRATES EFFECTIVE COPING SKILLS

## 2024-09-02 SDOH — HEALTH STABILITY: MENTAL HEALTH: WERE YOU ABLE TO COMPLETE ALL THE BEHAVIORAL HEALTH SCREENINGS?: YES

## 2024-09-02 SDOH — SOCIAL STABILITY: SOCIAL INSECURITY: ARE THERE ANY APPARENT SIGNS OF INJURIES/BEHAVIORS THAT COULD BE RELATED TO ABUSE/NEGLECT?: NO

## 2024-09-02 SDOH — SOCIAL STABILITY: SOCIAL INSECURITY: HAVE YOU HAD THOUGHTS OF HARMING ANYONE ELSE?: NO

## 2024-09-02 SDOH — HEALTH STABILITY: MENTAL HEALTH: NON-SPECIFIC ACTIVE SUICIDAL THOUGHTS (PAST 1 MONTH): NO

## 2024-09-02 SDOH — SOCIAL STABILITY: SOCIAL INSECURITY: HAS ANYONE EVER THREATENED TO HURT YOUR FAMILY OR YOUR PETS?: NO

## 2024-09-02 SDOH — SOCIAL STABILITY: SOCIAL INSECURITY: ABUSE SCREEN: ADULT

## 2024-09-02 SDOH — HEALTH STABILITY: MENTAL HEALTH: CURRENT SMOKER: 0

## 2024-09-02 ASSESSMENT — LIFESTYLE VARIABLES
HOW MANY STANDARD DRINKS CONTAINING ALCOHOL DO YOU HAVE ON A TYPICAL DAY: PATIENT DOES NOT DRINK
AUDIT-C TOTAL SCORE: 0
HOW OFTEN DO YOU HAVE A DRINK CONTAINING ALCOHOL: NEVER
AUDIT-C TOTAL SCORE: 0
HOW OFTEN DO YOU HAVE 6 OR MORE DRINKS ON ONE OCCASION: NEVER
SKIP TO QUESTIONS 9-10: 1

## 2024-09-02 ASSESSMENT — PAIN DESCRIPTION - DESCRIPTORS
DESCRIPTORS: CRAMPING
DESCRIPTORS: CRAMPING

## 2024-09-02 ASSESSMENT — PATIENT HEALTH QUESTIONNAIRE - PHQ9
1. LITTLE INTEREST OR PLEASURE IN DOING THINGS: NOT AT ALL
2. FEELING DOWN, DEPRESSED OR HOPELESS: NOT AT ALL
SUM OF ALL RESPONSES TO PHQ9 QUESTIONS 1 & 2: 0

## 2024-09-02 NOTE — H&P
Note Type:  OB Admission H&P    ASSESSMENT & PLAN: Lashaun Cruz is a 39 y.o.  at 39w0d who is admitted for Labor    Plan:    -Admit to L&D, consented,  cephalic based on: ultrasound  -Labs drawn:  T&S, CBC, and Syphilis  -Epidural at patient request  -Recheck as clinically indicated by maternal or fetal status    Fetal Status  -NST reactive, reassuring   -Presentation vertex based on bedside ultrasound  -EFW 7.5# by Leopold's   -1hr normal  -GBS negative  -NICU consult N/A     -Postpartum Contraception Plan: patient declined    Pregnancy Problems (from 24 to present)       Problem Noted Resolved    39 weeks gestation of pregnancy ([facility]) 2024 by Doug Briseno MD No    Priority:  Medium      Antepartum multigravida of advanced maternal age ([facility]) 7/3/2024 by SHONNA Andersen No    Priority:  Medium      Overview Signed 7/3/2024  5:57 PM by SHONNA Andersen     7/3/2024  No genetic testing noted. Patient 40 yo.  SGP         History of loop electrosurgical excision procedure (LEEP) of cervix affecting pregnancy in third trimester ([facility]) 7/3/2024 by SHONNA Andersen No    Priority:  Medium      History of precipitous labor and delivery 2024 by SHONNA Andersen No    Priority:  Medium      Overview Signed 2024 11:49 AM by SHONNA Andersen     2024  G1 4 hours start to finish. Please assess carefully in triage. SGP         Patent foramen ovale ([facility]) 2024 by Jackie Powers MD No    Priority:  Medium      Overview Addendum 2024 12:01 PM by SHONNA Andersen     Plan to consider Cardiology referral for clearance. This was discussed with patient.   Echo/ cardiology ordered.            Long-term current use of stimulant 10/11/2023 by Dominique Virgen No    Priority:  Medium      Overview Signed 2024  1:13 AM by SHONNA Roa     Patient is on  Adderall XR 20 mg 24 hr capsule. Patient is taking throughout pregnancy. Reports she has been counseled on risks by prescribing provider.          Attention deficit hyperactivity disorder (ADHD), predominantly inattentive type 2023 by Brock Cruz CMA No    Priority:  Medium      Overview Signed 2024  3:14 PM by SHONNA Roa     On Adderall XR 20 mg 24 hr capsule, declines cessation during pregnancy          Papillary carcinoma of thyroid ([facility]) 2023 by Brock Cruz CMA No    Priority:  Medium      Asthma affecting pregnancy, antepartum ([facility]) 2023 by Brock Crzu CMA No    Priority:  Medium      Overview Signed 7/3/2024  5:55 PM by SHONNA Andersen     7/3/2024  Avoid use of hemabate. SGP                 Subjective   Good fetal movement.  + FM, + contractions-denies need for pain interventions, planning NCB    Prenatal Provider Henry     OB History    Para Term  AB Living   2 1 1 0 0 1   SAB IAB Ectopic Multiple Live Births   0 0 0 0 1      # Outcome Date GA Lbr Travon/2nd Weight Sex Type Anes PTL Lv   2 Current            1 Term 23 39w0d  2.892 kg M Vag-Spont   HAIDER      Name: Vasile       Past Surgical History:   Procedure Laterality Date    OTHER SURGICAL HISTORY  10/07/2016    Dental Surgery    OTHER SURGICAL HISTORY  2019    [place] tooth extraction    OTHER SURGICAL HISTORY  2019    Varicose vein ligation    OTHER SURGICAL HISTORY  2014    Vaginal Pap smear    SINUS SURGERY  10/07/2016    Sinus Surgery       Social History     Tobacco Use    Smoking status: Never    Smokeless tobacco: Never   Substance Use Topics    Alcohol use: Never       Allergies   Allergen Reactions    Adhesive Tape-Silicones Rash     paper ok.adhesive and bandaids cause skin reaction    Amoxicillin-Pot Clavulanate Rash    Cefdinir Rash    Ciprofloxacin Rash    House Dust Rash    Levofloxacin Rash    Penicillins Rash       Medications  Prior to Admission   Medication Sig Dispense Refill Last Dose    Adderall XR 20 mg 24 hr capsule Take 1 capsule (20 mg) by mouth once daily in the morning. 30 capsule 0     albuterol 90 mcg/actuation inhaler Inhale 2 puffs. EVERY 4-6 HOURS AS NEEDED       azelastine (Astelin) 137 mcg (0.1 %) nasal spray Administer 1 spray into each nostril 2 times a day. Use in each nostril as directed 30 mL 11     diphenhydrAMINE (Benadryl Allergy) 25 mg tablet Take 1 tablet (25 mg) by mouth.       fexofenadine (Allegra) 180 mg tablet Take 1 tablet (180 mg) by mouth once daily.       fluticasone (Flonase Sensimist) 27.5 mcg/actuation nasal spray Administer 2 sprays into each nostril once daily.       fluticasone propion-salmeteroL (Advair Diskus) 250-50 mcg/dose diskus inhaler Inhale 1 puff 2 times a day. 60 each 11     hydrocortisone 2.5 % cream Apply topically.       prenatal vitamin, iron-folic, (Prenatal Vitamin) 27 mg iron-800 mcg folic acid tablet Take by mouth.       SUMAtriptan (Imitrex) 100 mg tablet Take 1 tablet (100 mg) by mouth 1 time if needed for migraine. 9 tablet 1     tretinoin (Retin-A) 0.05 % cream Apply topically once daily at bedtime.        Objective     Last Vitals  Temp Pulse Resp BP MAP O2 Sat     73   123/81 94       Blood Pressures         9/2/2024  1916             BP: 123/81             Physical Exam  General: NAD, mood appropriate  Cardiopulmonary: warm and well perfused, breathing comfortably on room air  Abdomen: Gravid, non-tender  Extremities: full range of motion  Speculum Exam: deferred per pt request  Cervix: deferred exam      Fetal Monitoring  Baseline: 145 bpm, Variability: moderate,  tracing is broken r/t frequent pt position changes and pt declining FHR monitor adjustment     Uterine Activity: q2-3 minutes    Interpretation: Category 1    Bedside ultrasound: Yes    Labs in chart were reviewed.          Prenatal labs reviewed, not remarkable.    JAMAL Lowery-DANDRE

## 2024-09-03 ENCOUNTER — APPOINTMENT (OUTPATIENT)
Dept: OBSTETRICS AND GYNECOLOGY | Facility: CLINIC | Age: 39
End: 2024-09-03

## 2024-09-03 VITALS
OXYGEN SATURATION: 95 % | HEART RATE: 72 BPM | RESPIRATION RATE: 17 BRPM | HEIGHT: 68 IN | WEIGHT: 203.04 LBS | DIASTOLIC BLOOD PRESSURE: 80 MMHG | SYSTOLIC BLOOD PRESSURE: 117 MMHG | BODY MASS INDEX: 30.77 KG/M2 | TEMPERATURE: 98.6 F

## 2024-09-03 PROBLEM — Z3A.39 39 WEEKS GESTATION OF PREGNANCY (HHS-HCC): Status: RESOLVED | Noted: 2024-09-02 | Resolved: 2024-09-03

## 2024-09-03 PROCEDURE — 2500000004 HC RX 250 GENERAL PHARMACY W/ HCPCS (ALT 636 FOR OP/ED): Performed by: ADVANCED PRACTICE MIDWIFE

## 2024-09-03 PROCEDURE — 2500000001 HC RX 250 WO HCPCS SELF ADMINISTERED DRUGS (ALT 637 FOR MEDICARE OP): Performed by: ADVANCED PRACTICE MIDWIFE

## 2024-09-03 PROCEDURE — 7100000016 HC LABOR RECOVERY PER HOUR

## 2024-09-03 RX ORDER — IBUPROFEN 600 MG/1
600 TABLET ORAL EVERY 6 HOURS PRN
Qty: 60 TABLET | Refills: 1 | Status: SHIPPED | OUTPATIENT
Start: 2024-09-03

## 2024-09-03 RX ORDER — DEXTROAMPHETAMINE SACCHARATE, AMPHETAMINE ASPARTATE MONOHYDRATE, DEXTROAMPHETAMINE SULFATE AND AMPHETAMINE SULFATE 5; 5; 5; 5 MG/1; MG/1; MG/1; MG/1
20 CAPSULE, EXTENDED RELEASE ORAL EVERY MORNING
Status: DISCONTINUED | OUTPATIENT
Start: 2024-09-03 | End: 2024-09-03

## 2024-09-03 RX ORDER — CLOBETASOL PROPIONATE 0.5 MG/G
CREAM TOPICAL 2 TIMES DAILY
Status: DISCONTINUED | OUTPATIENT
Start: 2024-09-03 | End: 2024-09-04 | Stop reason: HOSPADM

## 2024-09-03 RX ORDER — ALBUTEROL SULFATE 90 UG/1
2 INHALANT RESPIRATORY (INHALATION) EVERY 6 HOURS PRN
Status: DISCONTINUED | OUTPATIENT
Start: 2024-09-03 | End: 2024-09-04 | Stop reason: HOSPADM

## 2024-09-03 RX ORDER — FLUTICASONE FUROATE AND VILANTEROL 200; 25 UG/1; UG/1
1 POWDER RESPIRATORY (INHALATION)
Status: DISCONTINUED | OUTPATIENT
Start: 2024-09-03 | End: 2024-09-04 | Stop reason: HOSPADM

## 2024-09-03 RX ORDER — FLUTICASONE PROPIONATE AND SALMETEROL 250; 50 UG/1; UG/1
1 POWDER RESPIRATORY (INHALATION)
Status: DISCONTINUED | OUTPATIENT
Start: 2024-09-03 | End: 2024-09-03

## 2024-09-03 SDOH — HEALTH STABILITY: MENTAL HEALTH
STRESS IS WHEN SOMEONE FEELS TENSE, NERVOUS, ANXIOUS, OR CAN'T SLEEP AT NIGHT BECAUSE THEIR MIND IS TROUBLED. HOW STRESSED ARE YOU?: NOT AT ALL

## 2024-09-03 SDOH — ECONOMIC STABILITY: TRANSPORTATION INSECURITY
IN THE PAST 12 MONTHS, HAS LACK OF TRANSPORTATION KEPT YOU FROM MEETINGS, WORK, OR FROM GETTING THINGS NEEDED FOR DAILY LIVING?: NO

## 2024-09-03 SDOH — HEALTH STABILITY: MENTAL HEALTH: HOW OFTEN DO YOU HAVE 6 OR MORE DRINKS ON ONE OCCASION?: NEVER

## 2024-09-03 SDOH — HEALTH STABILITY: MENTAL HEALTH: HOW OFTEN DO YOU HAVE A DRINK CONTAINING ALCOHOL?: NEVER

## 2024-09-03 SDOH — SOCIAL STABILITY: SOCIAL INSECURITY: WITHIN THE LAST YEAR, HAVE YOU BEEN AFRAID OF YOUR PARTNER OR EX-PARTNER?: NO

## 2024-09-03 SDOH — HEALTH STABILITY: MENTAL HEALTH: HOW MANY STANDARD DRINKS CONTAINING ALCOHOL DO YOU HAVE ON A TYPICAL DAY?: PATIENT DOES NOT DRINK

## 2024-09-03 SDOH — ECONOMIC STABILITY: FOOD INSECURITY: WITHIN THE PAST 12 MONTHS, YOU WORRIED THAT YOUR FOOD WOULD RUN OUT BEFORE YOU GOT MONEY TO BUY MORE.: NEVER TRUE

## 2024-09-03 SDOH — SOCIAL STABILITY: SOCIAL INSECURITY
WITHIN THE LAST YEAR, HAVE YOU BEEN KICKED, HIT, SLAPPED, OR OTHERWISE PHYSICALLY HURT BY YOUR PARTNER OR EX-PARTNER?: NO

## 2024-09-03 SDOH — SOCIAL STABILITY: SOCIAL INSECURITY: WITHIN THE LAST YEAR, HAVE YOU BEEN HUMILIATED OR EMOTIONALLY ABUSED IN OTHER WAYS BY YOUR PARTNER OR EX-PARTNER?: NO

## 2024-09-03 SDOH — SOCIAL STABILITY: SOCIAL INSECURITY
WITHIN THE LAST YEAR, HAVE TO BEEN RAPED OR FORCED TO HAVE ANY KIND OF SEXUAL ACTIVITY BY YOUR PARTNER OR EX-PARTNER?: NO

## 2024-09-03 SDOH — HEALTH STABILITY: MENTAL HEALTH
HOW OFTEN DO YOU NEED TO HAVE SOMEONE HELP YOU WHEN YOU READ INSTRUCTIONS, PAMPHLETS, OR OTHER WRITTEN MATERIAL FROM YOUR DOCTOR OR PHARMACY?: NEVER

## 2024-09-03 SDOH — ECONOMIC STABILITY: TRANSPORTATION INSECURITY
IN THE PAST 12 MONTHS, HAS THE LACK OF TRANSPORTATION KEPT YOU FROM MEDICAL APPOINTMENTS OR FROM GETTING MEDICATIONS?: NO

## 2024-09-03 SDOH — ECONOMIC STABILITY: FOOD INSECURITY: WITHIN THE PAST 12 MONTHS, THE FOOD YOU BOUGHT JUST DIDN'T LAST AND YOU DIDN'T HAVE MONEY TO GET MORE.: NEVER TRUE

## 2024-09-03 SDOH — ECONOMIC STABILITY: INCOME INSECURITY: HOW HARD IS IT FOR YOU TO PAY FOR THE VERY BASICS LIKE FOOD, HOUSING, MEDICAL CARE, AND HEATING?: NOT HARD AT ALL

## 2024-09-03 ASSESSMENT — PAIN SCALES - GENERAL: PAINLEVEL_OUTOF10: 0 - NO PAIN

## 2024-09-03 ASSESSMENT — LIFESTYLE VARIABLES
AUDIT-C TOTAL SCORE: 0
SKIP TO QUESTIONS 9-10: 1

## 2024-09-03 ASSESSMENT — PAIN DESCRIPTION - DESCRIPTORS: DESCRIPTORS: CRAMPING

## 2024-09-03 NOTE — CARE PLAN
The patient's goals for the shift include bonding with baby.    The clinical goals for the shift include remain free from injury.    Over the shift, the patient did make progress toward the following goals. There are no current barriers to achieving these goals.

## 2024-09-03 NOTE — ANESTHESIA PREPROCEDURE EVALUATION
Patient: Lashaun Cruz    Evaluation Method: In-person visit    Procedure Information    Date: 09/02/24  Procedure: Labor Consult         Relevant Problems   Cardiac   (+) Patent foramen ovale ([facility]-H[facility])      Pulmonary   (+) Asthma affecting pregnancy, antepartum ([facility]-HCC)      Neuro   (+) Lumbosacral radiculopathy at L4      Endocrine   (+) Hypothyroid   (+) Multiple thyroid nodules   (+) Papillary carcinoma of thyroid (Multi)      HEENT   (+) Acute maxillary sinusitis      ID   (+) High risk HPV infection   (+) Yeast infection      Skin   (+) Dyshidrosis (pompholyx)      GYN   (+) 39 weeks gestation of pregnancy ([facility]-HCC)   (+) Antepartum multigravida of advanced maternal age ([facility])       Clinical information reviewed:    Allergies  Meds  Problems              NPO Detail:  No data recorded     OB/Gyn Evaluation    Present Pregnancy    Patient is pregnant now.   Obstetric History                Physical Exam    Airway  Mallampati: unable to assess  Neck ROM: full     Cardiovascular   Rhythm: regular  Rate: normal     Dental    Pulmonary    Abdominal      Other findings: Unable to assess at this time.          Anesthesia Plan    History of general anesthesia?: yes  History of complications of general anesthesia?: no    ASA 3   (Pt refusing any and all interventions (IV, labs, FHT, vaginal exam, vital signs) at this time. Pt's  states the patient has not had an epidural before and is not interested at this time. I informed pt and  there would be increased risk with attempt of an epidural. I was unable to go in to detail about increase risk of wet tap and the potential of scar tissue from microdiscectomy. Pt's  said the patient is still having vocal cord paresis and thyroid problems. I discussed the risks of a general anesthetic, with increase risk of a difficult intubation. I informed them that with no IV, it would take longer to be able to initiate a general anesthetic if needed. The  patient and  expressed their understanding and a verbal consent was given by the patient to start an IV and leave it heplocked. )  The patient is not a current smoker.    Anesthetic plan and risks discussed with patient and spouse.    Plan discussed with CRNA.

## 2024-09-03 NOTE — LACTATION NOTE
Lactation Consultant Note  Lactation Consultation  Reason for Consult: Initial assessment  Consultant Name: IBETH Butcher    Maternal Information  Has mother  before?: Yes  Previous Maternal Breastfeeding Challenges: None  Infant to breast within first 2 hours of birth?: Yes    Maternal Assessment  Breast Assessment: Large, Soft, Compressible  Nipple Assessment: Intact, Erect  Areola Assessment: Normal    Infant Assessment  Infant Behavior: Awake, Readiness to feed    Feeding Assessment  Nutrition Source: Breastmilk  Feeding Method: Nursing at the breast  Feeding Position: C - hold, Football/seated  Suck/Feeding: Sustained, Baby led rhythmically, Audible swallowing with stimulaton  Latch Assessment: Deep latch obtained, Instructed on deep latch, Eagerly grasped on to latch    LATCH TOOL  Latch: Grasps breast, tongue down, lips flanged, rhythmic sucking  Audible Swallowing: A few with stimulation  Type of Nipple: Everted (After stimulation)  Comfort (Breast/Nipple): Soft/non-tender  Hold (Positioning): No assist from staff, mother able to position/hold infant  LATCH Score: 9    Breast Pump       Other OB Lactation Tools       Patient Follow-up  Inpatient Lactation Follow-up Needed : No    Other OB Lactation Documentation       Recommendations/Summary  In to see mom for first visit. Mom an experienced BF. Mom states infant is Bf well. She has been latching well. Mom independently positioned infant at the breast without assistance needed. Infant eagerly latched to the breast deep latch. Long jaw movements seen and lips flanged. Few swallows noted. Mom states comfortable with latch. Reviewed BF basics with mom and discussed frequency of feeds, cluster feeding and expected output from infant in the first few days of life. Discussed outpatient resources for Bf support. Encouraged mom to call for any assistance if needed.

## 2024-09-03 NOTE — L&D DELIVERY NOTE
OB Delivery Note  2024  Lashaun Cruz  39 y.o.   Vaginal, Spontaneous       Gestational Age: 39w0d  /Para:   Quantitative Blood Loss: Admission to Discharge: 421 mL (2024  6:57 PM - 2024 11:03 PM)    Geetha Cruz [[id number]]      Labor Events    Sac identifier: Sac 1  Rupture date/time: 2024 1100  Rupture type: Spontaneous  Fluid color: Clear  Labor type: Spontaneous Onset of Labor  Labor allowed to proceed with plans for an attempted vaginal birth?: Yes  Augmentation: None  Complications: None       Labor Event Times    Start pushing date/time: 2024       Placenta    Placenta delivery date/time: 2024  Placenta removal: Spontaneous  Placenta appearance: Intact  Placenta disposition: family       Cord    Complications: None  Delayed cord clamping?: Yes  Cord clamped date/time: 2024 21:16:46  Cord blood disposition: Discarded  Gases sent?: No  Stem cell collection (by provider): No       Lacerations    Episiotomy: None  Perineal laceration: None  Other lacerations?: No  Repair suture: None       Anesthesia    Method: None       Operative Delivery    Forceps attempted?: No  Vacuum extractor attempted?: No       Shoulder Dystocia    Shoulder dystocia present?: No        Delivery    Time head delivered: 2024 21:06:00  Birth date/time: 2024 21:06:00  Delivery type: Vaginal, Spontaneous  Complications: None       Resuscitation    Method: Tactile stimulation       Apgars    Living status: Living  Apgar Component Scores:  1 min.:  5 min.:  10 min.:  15 min.:  20 min.:    Skin color:  1  1       Heart rate:  2  2       Reflex irritability:  2  2       Muscle tone:  2  2       Respiratory effort:  2  2       Total:  9  9       Apgars assigned by: HIMA DURBIN       Delivery Providers    Delivering clinician: JAMAL Lowery-DANDRE   Provider Role    Marina Carey RN Delivery Nurse    Austyn Durbin RN Nursery Nurse    Doug Briseno MD  Consulting Physician               Delivery Details:  Lashaun Cruz, a 39 y.o.  female delivered a viable Female infant with Apgars of 9  and 9 . The patient was  positioned on her hands and knees . Delivery was via Vaginal, Spontaneous to a sterile field under None anesthesia. Infant delivered in VertexRightOcciputAnterior position. Anterior and posterior shoulders delivered without difficulty. The cord was clamped twice, cut and 3 vessels were noted. Cord blood was obtained in routine fashion.  Cord complications were:  none. Intactplacenta delivered at 2024  9:20 PM. Fundal massage performed and fundus found to be firm. Perineum, vagina, cervix were inspected, and the following lacerations were noted:   Geetha Cruz [[id number]]      Lacerations    Episiotomy: None  Perineal laceration: None  Other lacerations?: No  Repair suture: None            Any lacerations were repaired in the usual fashion using None. Excellent hemostasis was noted. Needle count correct. Infant and patient in delivery room in good and stable condition.     Intrauterine Device Inserted: SHONNA Gilliland

## 2024-09-03 NOTE — PROGRESS NOTES
Postpartum Progress Note    Assessment/Plan   Lashaun Cruz is a 39 y.o., , who delivered at 39w0d gestation and is now postpartum day 1.    Continue routine postpartum care  Pain well controlled on PO medications  Patient has been assessed to have a DVT risk score of <5 prophylactic lovenox not indicated  Patient is Rh Positive (Rh+)., baby is Rh Positive (Rh+).; Not eligible  Encouraged breastfeeding, lactation consult as needed  Contraception methods discussed, including contraindication for use of estrogen in immediate postpartum period  Appropriate for discharge on PPD #2 if remains stable  Advised patient to follow up in 4-6 weeks with primary OB provider for routine postpartum visit    SHONNA Lowery    Subjective   Her pain is well controlled with current medications  She is passing flatus  She is ambulating well  She is tolerating a Adult diet Regular  She reports no breast or nursing problems  She denies emotional concerns today   Her plan for contraception is none         Principal Problem:    39 weeks gestation of pregnancy ([facility])    Pregnancy Problems (from 24 to present)       Problem Noted Resolved    39 weeks gestation of pregnancy ([facility]) 2024 by Doug Briseno MD No    Priority:  Medium      Antepartum multigravida of advanced maternal age ([facility]) 7/3/2024 by SHONNA Andersen No    Priority:  Medium      Overview Signed 7/3/2024  5:57 PM by SHONNA Andersen     7/3/2024  No genetic testing noted. Patient 40 yo.  SGP         History of loop electrosurgical excision procedure (LEEP) of cervix affecting pregnancy in third trimester ([facility]) 7/3/2024 by SHONNA Andersen No    Priority:  Medium      History of precipitous labor and delivery 2024 by SHONNA Andersen No    Priority:  Medium      Overview Signed 2024 11:49 AM by SHONNA Andersen     2024  G1 4  hours start to finish. Please assess carefully in triage. SGP         Patent foramen ovale ([facility]) 4/25/2024 by Jackie Powers MD No    Priority:  Medium      Overview Addendum 7/2/2024 12:01 PM by SHONNA Andersen     Plan to consider Cardiology referral for clearance. This was discussed with patient.   Echo/ cardiology ordered.            Long-term current use of stimulant 10/11/2023 by Dominique Virgen No    Priority:  Medium      Overview Signed 6/7/2024  1:13 AM by SHONNA Roa     Patient is on Adderall XR 20 mg 24 hr capsule. Patient is taking throughout pregnancy. Reports she has been counseled on risks by prescribing provider.          Attention deficit hyperactivity disorder (ADHD), predominantly inattentive type 5/16/2023 by Brock Cruz CMA No    Priority:  Medium      Overview Signed 5/9/2024  3:14 PM by SHONNA Roa     On Adderall XR 20 mg 24 hr capsule, declines cessation during pregnancy          Papillary carcinoma of thyroid (Multi) 5/16/2023 by Brock Cruz CMA No    Priority:  Medium      Asthma affecting pregnancy, antepartum ([facility]) 5/16/2023 by Brock Cruz CMA No    Priority:  Medium      Overview Signed 7/3/2024  5:55 PM by SHONNA Andersen     7/3/2024  Avoid use of hemabate. [facility]               Hospital course: no complications    Objective   Allergies:   Adhesive tape-silicones, Amoxicillin-pot clavulanate, Cefdinir, Ciprofloxacin, House dust, Levofloxacin, and Penicillins         Last Vitals:  Temp Pulse Resp BP MAP Pulse Ox   36.5 °C (97.7 °F) 66 16 102/58   98 %     Vitals Min/Max Last 24 Hours:  Temp  Min: 36.1 °C (97 °F)  Max: 37.3 °C (99.1 °F)  Pulse  Min: 63  Max: 75  Resp  Min: 16  Max: 19  BP  Min: 93/56  Max: 129/78    Intake/Output:     Intake/Output Summary (Last 24 hours) at 9/3/2024 1555  Last data filed at 9/2/2024 2351  Gross per 24 hour   Intake --   Output 584 ml   Net -584 ml       Physical  Exam:  General: Examination reveals a well developed, well nourished, female, in no acute distress. She is alert and cooperative.  Abdomen: soft non tender.  Fundus: firm.  Perineum: well healing.  Extremities: no redness or tenderness in the calves or thighs, no edema.  Neurological: DTRs normal and symmetrical.  Psychological: awake and alert; oriented to person, place, and time.    Lab Data:  Labs in chart were reviewed.    SHONNA Lowery

## 2024-09-04 NOTE — DISCHARGE SUMMARY
Discharge Summary    Admission Date: 9/2/2024  Discharge Date: 9/3/2024    Discharge Diagnosis  39 weeks gestation of pregnancy ([facility])    Hospital Course  Delivery Date: 9/2/2024 9:06 PM  Delivery type: Vaginal, Spontaneous   GA at delivery: 39w0d  Outcome: Living  Anesthesia during delivery: None  Intrapartum complications: None  Feeding method: Breastfeeding Status: Yes     Procedures: none  Contraception at discharge: none      Last Vitals:  Temp Pulse Resp BP MAP Pulse Ox   37 °C (98.6 °F) 72 17 117/80 88 95 %     Discharge Meds     Your medication list        START taking these medications        Instructions Last Dose Given Next Dose Due   clobetasoL 0.025 % cream      Apply 1 Application topically 2 times a day.       ibuprofen 600 mg tablet      Take 1 tablet (600 mg) by mouth every 6 hours if needed for mild pain (1 - 3).              CONTINUE taking these medications        Instructions Last Dose Given Next Dose Due   Adderall XR 20 mg 24 hr capsule  Generic drug: amphetamine-dextroamphetamine XR      Take 1 capsule (20 mg) by mouth once daily in the morning.       albuterol 90 mcg/actuation inhaler           azelastine 137 mcg (0.1 %) nasal spray  Commonly known as: Astelin      Administer 1 spray into each nostril 2 times a day. Use in each nostril as directed       Benadryl Allergy 25 mg tablet  Generic drug: diphenhydrAMINE           fexofenadine 180 mg tablet  Commonly known as: Allegra           Flonase Sensimist 27.5 mcg/actuation nasal spray  Generic drug: fluticasone           fluticasone propion-salmeteroL 250-50 mcg/dose diskus inhaler  Commonly known as: Advair Diskus      Inhale 1 puff 2 times a day.       hydrocortisone 2.5 % cream           Prenatal Vitamin 27 mg iron- 0.8 mg tablet  Generic drug: prenatal vitamin (iron-folic)           SUMAtriptan 100 mg tablet  Commonly known as: Imitrex      Take 1 tablet (100 mg) by mouth 1 time if needed for migraine.       tretinoin 0.05 %  cream  Commonly known as: Retin-A                     Where to Get Your Medications        These medications were sent to [id number] - [place], [place] - [address] [place] [address] [place] [address]      Phone: [phone]   clobetasoL 0.025 % cream  ibuprofen 600 mg tablet          Complications Requiring Follow-Up  None    Test Results Pending At Discharge  Pending Labs       No current pending labs.            Outpatient Follow-Up  Future Appointments   Date Time Provider Department Center   9/9/2024  8:45 AM JAMAL Wing-DANDRE [id number] [facility]   10/31/2024  9:30 AM Jackie Powers MD [id number] [facility] spent 10 minutes in the professional and overall care of this patient.      Sima Sanders MD

## 2024-09-05 DIAGNOSIS — F90.0 ADHD, PREDOMINANTLY INATTENTIVE TYPE: ICD-10-CM

## 2024-09-05 RX ORDER — DEXTROAMPHETAMINE SULFATE, DEXTROAMPHETAMINE SACCHARATE, AMPHETAMINE SULFATE AND AMPHETAMINE ASPARTATE 5; 5; 5; 5 MG/1; MG/1; MG/1; MG/1
20 CAPSULE, EXTENDED RELEASE ORAL EVERY MORNING
Qty: 30 CAPSULE | Refills: 0 | Status: SHIPPED | OUTPATIENT
Start: 2024-09-05

## 2024-09-09 ENCOUNTER — APPOINTMENT (OUTPATIENT)
Dept: OBSTETRICS AND GYNECOLOGY | Facility: CLINIC | Age: 39
End: 2024-09-09
Payer: COMMERCIAL

## 2024-09-10 ENCOUNTER — TELEPHONE (OUTPATIENT)
Dept: OBSTETRICS AND GYNECOLOGY | Facility: CLINIC | Age: 39
End: 2024-09-10
Payer: COMMERCIAL

## 2024-09-10 NOTE — TELEPHONE ENCOUNTER
Spoke with patient, states on Sunday she passed large clot. Bleeding has been normal since, passing small clots occasionally. Patient denies feeling dizzy, lightheaded or faint. Patient advised to continue to monitor bleeding. If having heavy bleeding, soaking a pad within an hour or passing clots larger than egg or golf ball to go to L & D triage for evaluation.

## 2024-10-05 DIAGNOSIS — F90.0 ADHD, PREDOMINANTLY INATTENTIVE TYPE: ICD-10-CM

## 2024-10-05 DIAGNOSIS — J01.00 ACUTE NON-RECURRENT MAXILLARY SINUSITIS: Primary | ICD-10-CM

## 2024-10-05 RX ORDER — AZITHROMYCIN 250 MG/1
TABLET, FILM COATED ORAL
Qty: 6 TABLET | Refills: 0 | Status: SHIPPED | OUTPATIENT
Start: 2024-10-05 | End: 2024-10-10

## 2024-10-05 RX ORDER — DEXTROAMPHETAMINE SULFATE, DEXTROAMPHETAMINE SACCHARATE, AMPHETAMINE SULFATE AND AMPHETAMINE ASPARTATE 5; 5; 5; 5 MG/1; MG/1; MG/1; MG/1
20 CAPSULE, EXTENDED RELEASE ORAL EVERY MORNING
Qty: 30 CAPSULE | Refills: 0 | Status: SHIPPED | OUTPATIENT
Start: 2024-10-05

## 2024-10-07 ENCOUNTER — TELEPHONE (OUTPATIENT)
Dept: PRIMARY CARE | Facility: CLINIC | Age: 39
End: 2024-10-07
Payer: COMMERCIAL

## 2024-10-07 DIAGNOSIS — J01.00 ACUTE NON-RECURRENT MAXILLARY SINUSITIS: ICD-10-CM

## 2024-10-07 RX ORDER — AZITHROMYCIN 250 MG/1
TABLET, FILM COATED ORAL
Qty: 6 TABLET | Refills: 0 | Status: SHIPPED | OUTPATIENT
Start: 2024-10-07 | End: 2024-10-11

## 2024-10-07 NOTE — TELEPHONE ENCOUNTER
She has  a sinus infection for a week now and would like a z pack called in,  she is coming in to see you at the end of the month. Please advise.

## 2024-10-17 ENCOUNTER — APPOINTMENT (OUTPATIENT)
Dept: OBSTETRICS AND GYNECOLOGY | Facility: CLINIC | Age: 39
End: 2024-10-17
Payer: COMMERCIAL

## 2024-10-17 VITALS
BODY MASS INDEX: 29.1 KG/M2 | HEIGHT: 68 IN | SYSTOLIC BLOOD PRESSURE: 104 MMHG | DIASTOLIC BLOOD PRESSURE: 80 MMHG | WEIGHT: 192 LBS

## 2024-10-17 DIAGNOSIS — Z87.42 H/O ABNORMAL CERVICAL PAPANICOLAOU SMEAR: ICD-10-CM

## 2024-10-17 PROBLEM — M62.81 GENERALIZED MUSCLE WEAKNESS: Status: RESOLVED | Noted: 2023-05-16 | Resolved: 2024-10-17

## 2024-10-17 PROBLEM — M25.512 LEFT SHOULDER PAIN: Status: RESOLVED | Noted: 2023-05-16 | Resolved: 2024-10-17

## 2024-10-17 PROBLEM — Z87.59 HISTORY OF PRECIPITOUS LABOR AND DELIVERY: Status: RESOLVED | Noted: 2024-07-02 | Resolved: 2024-10-17

## 2024-10-17 PROBLEM — S42.002D FRACTURE OF LEFT CLAVICLE WITH ROUTINE HEALING: Status: RESOLVED | Noted: 2023-10-04 | Resolved: 2024-10-17

## 2024-10-17 PROBLEM — B37.9 YEAST INFECTION: Status: RESOLVED | Noted: 2023-05-16 | Resolved: 2024-10-17

## 2024-10-17 PROBLEM — O99.519 ASTHMA AFFECTING PREGNANCY, ANTEPARTUM (HHS-HCC): Status: RESOLVED | Noted: 2023-05-16 | Resolved: 2024-10-17

## 2024-10-17 PROBLEM — M25.619 DECREASED RANGE OF MOTION OF SHOULDER: Status: RESOLVED | Noted: 2023-10-04 | Resolved: 2024-10-17

## 2024-10-17 PROBLEM — O09.529 ANTEPARTUM MULTIGRAVIDA OF ADVANCED MATERNAL AGE (HHS-HCC): Status: RESOLVED | Noted: 2024-07-03 | Resolved: 2024-10-17

## 2024-10-17 PROBLEM — J45.909 ASTHMA AFFECTING PREGNANCY, ANTEPARTUM (HHS-HCC): Status: RESOLVED | Noted: 2023-05-16 | Resolved: 2024-10-17

## 2024-10-17 PROCEDURE — 88175 CYTOPATH C/V AUTO FLUID REDO: CPT

## 2024-10-17 PROCEDURE — 87624 HPV HI-RISK TYP POOLED RSLT: CPT

## 2024-10-17 PROCEDURE — 0503F POSTPARTUM CARE VISIT: CPT | Performed by: ADVANCED PRACTICE MIDWIFE

## 2024-10-17 ASSESSMENT — ENCOUNTER SYMPTOMS
RESPIRATORY NEGATIVE: 0
CONSTITUTIONAL NEGATIVE: 0
GASTROINTESTINAL NEGATIVE: 0
PSYCHIATRIC NEGATIVE: 0
ALLERGIC/IMMUNOLOGIC NEGATIVE: 0
NEUROLOGICAL NEGATIVE: 0
MUSCULOSKELETAL NEGATIVE: 0
ENDOCRINE NEGATIVE: 0
HEMATOLOGIC/LYMPHATIC NEGATIVE: 0
EYES NEGATIVE: 0
CARDIOVASCULAR NEGATIVE: 0

## 2024-10-17 ASSESSMENT — EDINBURGH POSTNATAL DEPRESSION SCALE (EPDS)
THINGS HAVE BEEN GETTING ON TOP OF ME: NO, MOST OF THE TIME I HAVE COPED QUITE WELL
I HAVE BEEN ANXIOUS OR WORRIED FOR NO GOOD REASON: NO, NOT AT ALL
I HAVE BEEN ABLE TO LAUGH AND SEE THE FUNNY SIDE OF THINGS: AS MUCH AS I ALWAYS COULD
TOTAL SCORE: 2
THE THOUGHT OF HARMING MYSELF HAS OCCURRED TO ME: NEVER
I HAVE LOOKED FORWARD WITH ENJOYMENT TO THINGS: AS MUCH AS I EVER DID
I HAVE BEEN SO UNHAPPY THAT I HAVE HAD DIFFICULTY SLEEPING: NOT AT ALL
I HAVE FELT SCARED OR PANICKY FOR NO GOOD REASON: NO, NOT AT ALL
I HAVE FELT SAD OR MISERABLE: NO, NOT AT ALL
I HAVE BLAMED MYSELF UNNECESSARILY WHEN THINGS WENT WRONG: NO, NEVER
I HAVE BEEN SO UNHAPPY THAT I HAVE BEEN CRYING: ONLY OCCASIONALLY

## 2024-10-17 NOTE — PROGRESS NOTES
Plan    Advised to call office for breast complaints, abnormal bleeding, mood changes, or other concerning symptoms.   Cleared to resume normal activity as desired    Discussed decreased sexual satisfaction with laxity of pelvic floor muscle tension following vaginal delivery. She will begin her pelvic floor exercises and consider a referral to UroGYN to discuss vaginal rejuvenation if no significant improvement over the next 3-6 months.     Diagnoses and all orders for this visit:  Postpartum care following vaginal delivery ([facility])  H/O abnormal cervical Papanicolaou smear  -     THINPREP PAP TEST (>30)      SHONNA Cochran    Subjective   39 y.o.  presenting for postpartum follow-up     Delivery Date: 2024  Gestational Age: <None>   Type of Delivery: Vaginal, Spontaneous     Pregnancy Problems (from 24 to present)       Problem Noted Diagnosed Resolved    Antepartum multigravida of advanced maternal age ([facility]) 7/3/2024 by SHONNA Andersen  No    Priority:  Medium       Overview Signed 7/3/2024  5:57 PM by SHONNA Andersen     7/3/2024  No genetic testing noted. Patient 40 yo.  SGP         History of loop electrosurgical excision procedure (LEEP) of cervix affecting pregnancy in third trimester ([facility]) 7/3/2024 by SHONNA Andersen  No    Priority:  Medium       History of precipitous labor and delivery 2024 by SHONNA Andersen  No    Priority:  Medium       Overview Signed 2024 11:49 AM by SHONNA Andersen     2024  G1 4 hours start to finish. Please assess carefully in triage. SGP         Patent foramen ovale ([facility]) 2024 by Jackie Powers MD  No    Priority:  Medium       Overview Addendum 2024 12:01 PM by SHONNA Andersen     Plan to consider Cardiology referral for clearance. This was discussed with patient.   Echo/ cardiology ordered.             Long-term current use of stimulant 10/11/2023 by Dominique Virgen  No    Priority:  Medium       Overview Signed 2024  1:13 AM by SHONNA Roa     Patient is on Adderall XR 20 mg 24 hr capsule. Patient is taking throughout pregnancy. Reports she has been counseled on risks by prescribing provider.          Attention deficit hyperactivity disorder (ADHD), predominantly inattentive type 2023 by Brock Cruz CMA  No    Priority:  Medium       Overview Signed 2024  3:14 PM by SHONNA Roa     On Adderall XR 20 mg 24 hr capsule, declines cessation during pregnancy          Papillary carcinoma of thyroid (Multi) 2023 by Brock Cruz CMA  No    Priority:  Medium       Asthma affecting pregnancy, antepartum ([facility]) 2023 by Brock Cruz CMA  No    Priority:  Medium       Overview Signed 7/3/2024  5:55 PM by SHONNA Andersen     7/3/2024  Avoid use of hemabate. SGP         39 weeks gestation of pregnancy ([facility]) 2024 by Doug Briseno MD  9/3/2024 by Sima Sanders MD    Priority:  Medium               Concerns:     Pain: controlled  Lacerations: none  Lochia: resolved  Sexual Intimacy: Yes  Contraceptive Method: None  Feeding Method: She is breast feeding exclusively. no breast or nursing problems  Lactation Consult Needed?: No    Birth Trauma: No  Bonding with Baby: well with baby boy,   Mood:   Postpartum Depression: Low Risk  (6/10/2024)    Arnold  Depression Scale     Last EPDS Total Score: 0     Last EPDS Self Harm Result: Never       Last pap: repeated today  Physical Exam  Constitutional:       Appearance: Normal appearance.   Cardiovascular:      Rate and Rhythm: Normal rate and regular rhythm.   Pulmonary:      Effort: Pulmonary effort is normal.   Abdominal:      General: Abdomen is flat.      Palpations: Abdomen is soft.   Musculoskeletal:         General: Normal range of motion.      Cervical back: Normal  range of motion.   Neurological:      General: No focal deficit present.      Mental Status: She is alert and oriented to person, place, and time. Mental status is at baseline.   Skin:     General: Skin is warm and dry.   Psychiatric:         Mood and Affect: Mood normal.         Behavior: Behavior normal.         Thought Content: Thought content normal.         Judgment: Judgment normal.    Shonda Gramajo, JAMAL-DANDRE

## 2024-10-29 LAB
CYTOLOGY CMNT CVX/VAG CYTO-IMP: NORMAL
HPV HR 12 DNA GENITAL QL NAA+PROBE: NEGATIVE
HPV HR GENOTYPES PNL CVX NAA+PROBE: NEGATIVE
HPV16 DNA SPEC QL NAA+PROBE: NEGATIVE
HPV18 DNA SPEC QL NAA+PROBE: NEGATIVE
LAB AP HPV GENOTYPE QUESTION: YES
LAB AP HPV HR: NORMAL
LABORATORY COMMENT REPORT: NORMAL
PATH REPORT.TOTAL CANCER: NORMAL

## 2024-10-31 ENCOUNTER — APPOINTMENT (OUTPATIENT)
Dept: PRIMARY CARE | Facility: CLINIC | Age: 39
End: 2024-10-31
Payer: COMMERCIAL

## 2024-10-31 VITALS
DIASTOLIC BLOOD PRESSURE: 90 MMHG | OXYGEN SATURATION: 96 % | HEART RATE: 90 BPM | SYSTOLIC BLOOD PRESSURE: 114 MMHG | TEMPERATURE: 96.7 F | HEIGHT: 68 IN | RESPIRATION RATE: 12 BRPM | WEIGHT: 191.8 LBS | BODY MASS INDEX: 29.07 KG/M2

## 2024-10-31 DIAGNOSIS — Z98.890 HISTORY OF LOOP ELECTROSURGICAL EXCISION PROCEDURE (LEEP) OF CERVIX AFFECTING PREGNANCY IN THIRD TRIMESTER (HHS-HCC): ICD-10-CM

## 2024-10-31 DIAGNOSIS — O99.519 ASTHMA AFFECTING PREGNANCY, ANTEPARTUM (HHS-HCC): ICD-10-CM

## 2024-10-31 DIAGNOSIS — Q21.12 PATENT FORAMEN OVALE (HHS-HCC): Primary | ICD-10-CM

## 2024-10-31 DIAGNOSIS — J45.909 ASTHMA AFFECTING PREGNANCY, ANTEPARTUM (HHS-HCC): ICD-10-CM

## 2024-10-31 DIAGNOSIS — R49.0 HOARSENESS OF VOICE: ICD-10-CM

## 2024-10-31 DIAGNOSIS — R49.0 DYSPHONIA: ICD-10-CM

## 2024-10-31 DIAGNOSIS — D80.3 SELECTIVE DEFICIENCY OF IGG (MULTI): ICD-10-CM

## 2024-10-31 DIAGNOSIS — M54.17 LUMBOSACRAL RADICULOPATHY AT L4: ICD-10-CM

## 2024-10-31 DIAGNOSIS — E03.9 HYPOTHYROIDISM, UNSPECIFIED TYPE: ICD-10-CM

## 2024-10-31 DIAGNOSIS — C73 PAPILLARY CARCINOMA OF THYROID (MULTI): ICD-10-CM

## 2024-10-31 DIAGNOSIS — F90.0 ATTENTION DEFICIT HYPERACTIVITY DISORDER (ADHD), PREDOMINANTLY INATTENTIVE TYPE: ICD-10-CM

## 2024-10-31 DIAGNOSIS — O34.43 HISTORY OF LOOP ELECTROSURGICAL EXCISION PROCEDURE (LEEP) OF CERVIX AFFECTING PREGNANCY IN THIRD TRIMESTER (HHS-HCC): ICD-10-CM

## 2024-10-31 PROBLEM — R49.9 CHANGE IN VOICE: Status: RESOLVED | Noted: 2023-10-04 | Resolved: 2024-10-31

## 2024-10-31 PROBLEM — G89.29 CHRONIC PAIN: Status: RESOLVED | Noted: 2023-10-11 | Resolved: 2024-10-31

## 2024-10-31 PROBLEM — J01.00 ACUTE MAXILLARY SINUSITIS: Status: RESOLVED | Noted: 2023-10-04 | Resolved: 2024-10-31

## 2024-10-31 PROBLEM — T84.84XA PAINFUL ORTHOPAEDIC HARDWARE (CMS-HCC): Status: RESOLVED | Noted: 2023-05-16 | Resolved: 2024-10-31

## 2024-10-31 PROCEDURE — 3008F BODY MASS INDEX DOCD: CPT | Performed by: INTERNAL MEDICINE

## 2024-10-31 PROCEDURE — 1036F TOBACCO NON-USER: CPT | Performed by: INTERNAL MEDICINE

## 2024-10-31 PROCEDURE — 99214 OFFICE O/P EST MOD 30 MIN: CPT | Performed by: INTERNAL MEDICINE

## 2024-10-31 RX ORDER — AZELASTINE 1 MG/ML
1 SPRAY, METERED NASAL 2 TIMES DAILY
Qty: 30 ML | Refills: 11 | Status: SHIPPED | OUTPATIENT
Start: 2024-10-31

## 2024-10-31 ASSESSMENT — PATIENT HEALTH QUESTIONNAIRE - PHQ9
SUM OF ALL RESPONSES TO PHQ9 QUESTIONS 1 AND 2: 0
1. LITTLE INTEREST OR PLEASURE IN DOING THINGS: NOT AT ALL
2. FEELING DOWN, DEPRESSED OR HOPELESS: NOT AT ALL

## 2024-11-06 DIAGNOSIS — F90.0 ADHD, PREDOMINANTLY INATTENTIVE TYPE: ICD-10-CM

## 2024-11-06 RX ORDER — DEXTROAMPHETAMINE SULFATE, DEXTROAMPHETAMINE SACCHARATE, AMPHETAMINE SULFATE AND AMPHETAMINE ASPARTATE 5; 5; 5; 5 MG/1; MG/1; MG/1; MG/1
20 CAPSULE, EXTENDED RELEASE ORAL EVERY MORNING
Qty: 30 CAPSULE | Refills: 0 | Status: SHIPPED | OUTPATIENT
Start: 2024-11-06

## 2024-12-08 DIAGNOSIS — F90.0 ADHD, PREDOMINANTLY INATTENTIVE TYPE: ICD-10-CM

## 2024-12-08 RX ORDER — DEXTROAMPHETAMINE SULFATE, DEXTROAMPHETAMINE SACCHARATE, AMPHETAMINE SULFATE AND AMPHETAMINE ASPARTATE 5; 5; 5; 5 MG/1; MG/1; MG/1; MG/1
20 CAPSULE, EXTENDED RELEASE ORAL EVERY MORNING
Qty: 30 CAPSULE | Refills: 0 | Status: SHIPPED | OUTPATIENT
Start: 2024-12-08

## 2025-01-08 DIAGNOSIS — F90.0 ADHD, PREDOMINANTLY INATTENTIVE TYPE: ICD-10-CM

## 2025-01-08 RX ORDER — DEXTROAMPHETAMINE SULFATE, DEXTROAMPHETAMINE SACCHARATE, AMPHETAMINE SULFATE AND AMPHETAMINE ASPARTATE 5; 5; 5; 5 MG/1; MG/1; MG/1; MG/1
20 CAPSULE, EXTENDED RELEASE ORAL EVERY MORNING
Qty: 30 CAPSULE | Refills: 0 | Status: SHIPPED | OUTPATIENT
Start: 2025-01-08

## 2025-01-22 DIAGNOSIS — G43.001 MIGRAINE WITHOUT AURA AND WITH STATUS MIGRAINOSUS, NOT INTRACTABLE: ICD-10-CM

## 2025-01-22 RX ORDER — SUMATRIPTAN SUCCINATE 100 MG/1
100 TABLET ORAL ONCE AS NEEDED
Qty: 9 TABLET | Refills: 1 | Status: SHIPPED | OUTPATIENT
Start: 2025-01-22

## 2025-02-07 DIAGNOSIS — F90.0 ADHD, PREDOMINANTLY INATTENTIVE TYPE: ICD-10-CM

## 2025-02-07 RX ORDER — DEXTROAMPHETAMINE SULFATE, DEXTROAMPHETAMINE SACCHARATE, AMPHETAMINE SULFATE AND AMPHETAMINE ASPARTATE 5; 5; 5; 5 MG/1; MG/1; MG/1; MG/1
20 CAPSULE, EXTENDED RELEASE ORAL EVERY MORNING
Qty: 30 CAPSULE | Refills: 0 | Status: SHIPPED | OUTPATIENT
Start: 2025-02-07

## 2025-02-12 NOTE — PROGRESS NOTES
Urogynecology  Provider:  Bridget Malonye MD  [phone]              ASSESSMENT AND PLAN:     Problem List Items Addressed This Visit    None          I spent a total of *** minutes in face to face and non face to face time.      Bridget Maloney MD              HISTORY OF PRESENT ILLNESS:      39 y.o.  s/p  on 25 with urinary incontinence issues.     Record Review:     Prolapse Symptoms :     Urinary Symptoms:     Bowel Symptoms:     Sexual Activity:          Past Medical History:       Past Medical History:   Diagnosis Date    Acute candidiasis of vulva and vagina 2017    Vaginal candidiasis    Acute maxillary sinusitis, unspecified 2022    Sinusitis, acute, maxillary    Acute vaginitis 2020    Bacterial vaginitis    Cervical disc disorder, unspecified, unspecified cervical region 2020    Disorder of intervertebral disc of cervical spine    Cutaneous abscess of unspecified foot 2014    Toe abscess    Dermatitis, unspecified 2017    Eczema of left hand    Displaced fracture of shaft of left clavicle, initial encounter for closed fracture 10/11/2021    Closed displaced fracture of shaft of left clavicle, initial encounter    Dyshidrosis (pompholyx) 2019    Dyshidrosis    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    Hypoxia 2023    Iliotibial band syndrome, right leg 2019    Iliotibial band syndrome of right side    Local infection of the skin and subcutaneous tissue, unspecified 2014    Toe infection    Localized swelling, mass and lump, head 10/14/2021    Localized swelling, mass and lump, head    Otalgia, right ear 2017    Chronic right ear pain    Other chest pain 01/15/2018    Chest pain, localized    Other intervertebral disc displacement, lumbar region 10/20/2021    Lumbar disc herniation    Other specified symptoms and signs involving the circulatory and respiratory systems 10/11/2016    Globus sensation    Other  sprain of right hip, initial encounter 05/05/2020    Tear of right acetabular labrum, initial encounter    Other symptoms and signs involving the musculoskeletal system 09/27/2021    Weakness of right hip    Pain in left finger(s) 03/20/2018    Thumb pain, left    Pain in right hip 01/26/2021    Hip pain, right    Pain in unspecified knee     Joint pain, knee    Pelvic and perineal pain 03/23/2020    Pain, pelvic, female    Personal history of diseases of the skin and subcutaneous tissue 11/14/2019    History of pustular acne    Personal history of other benign neoplasm 01/26/2021    History of other benign neoplasm    Personal history of other diseases of the digestive system     History of hemorrhoids    Personal history of other diseases of the female genital tract 06/30/2020    History of dysfunctional uterine bleeding    Personal history of other diseases of the musculoskeletal system and connective tissue 08/14/2013    History of low back pain    Personal history of other diseases of the respiratory system 04/13/2017    History of acute bronchitis    Personal history of other endocrine, nutritional and metabolic disease 03/23/2020    History of thyroid nodule    Personal history of other endocrine, nutritional and metabolic disease 03/09/2020    History of hyperthyroidism    Personal history of other infectious and parasitic diseases 04/08/2019    History of traveler's diarrhea    Rash and other nonspecific skin eruption 02/09/2021    Rash    Right upper quadrant pain 12/23/2017    Colicky right upper quadrant pain    Segmental and somatic dysfunction of pelvic region 01/30/2020    Segmental and somatic dysfunction of pelvic region    Sprain of metacarpophalangeal joint of left thumb, initial encounter 03/25/2018    Sprain of metacarpophalangeal (MCP) joint of left thumb, initial encounter    Strain of muscle, fascia and tendon of right hip, initial encounter 05/27/2020    Tear of right gluteus medius tendon     Thyrotoxicosis, unspecified without thyrotoxic crisis or storm 08/25/2022    Subclinical hyperthyroidism    Trochanteric bursitis, right hip 05/27/2020    Trochanteric bursitis of right hip    Unspecified injury of left wrist, hand and finger(s), subsequent encounter 05/08/2018    Injury of left thumb, subsequent encounter    Urinary tract infection, site not specified 08/19/2021    Acute UTI          Past Surgical History:       Past Surgical History:   Procedure Laterality Date    OTHER SURGICAL HISTORY  10/07/2016    Dental Surgery    OTHER SURGICAL HISTORY  09/20/2019    [place] tooth extraction    OTHER SURGICAL HISTORY  09/20/2019    Varicose vein ligation    OTHER SURGICAL HISTORY  01/11/2014    Vaginal Pap smear    SINUS SURGERY  10/07/2016    Sinus Surgery         Medications:       Prior to Admission medications    Medication Sig Start Date End Date Taking? Authorizing Provider   Adderall XR 20 mg 24 hr capsule Take 1 capsule (20 mg) by mouth once daily in the morning. 2/7/25   Jackie Powers MD   albuterol 90 mcg/actuation inhaler Inhale 2 puffs. EVERY 4-6 HOURS AS NEEDED 11/10/22   Historical Provider, MD   azelastine (Astelin) 137 mcg (0.1 %) nasal spray Administer 1 spray into each nostril 2 times a day. Use in each nostril as directed 10/31/24   Jackie Powers MD   clobetasoL 0.025 % cream Apply 1 Application topically 2 times a day. 9/3/24   Shonda Gramajo APRN-DANDRE   diphenhydrAMINE (Benadryl Allergy) 25 mg tablet Take 1 tablet (25 mg) by mouth. 10/25/22   Historical Provider, MD   fexofenadine (Allegra) 180 mg tablet Take 1 tablet (180 mg) by mouth once daily.    Historical Provider, MD   fluticasone (Flonase Sensimist) 27.5 mcg/actuation nasal spray Administer 2 sprays into each nostril once daily.    Historical Provider, MD   fluticasone propion-salmeteroL (Advair Diskus) 250-50 mcg/dose diskus inhaler Inhale 1 puff 2 times a day. 4/25/24   Jackie Powers MD   ibuprofen 600 mg tablet  Take 1 tablet (600 mg) by mouth every 6 hours if needed for mild pain (1 - 3). 9/3/24   Shonda SHELTON Gramajo, APRN-CNM   prenatal vitamin, iron-folic, (Prenatal Vitamin) 27 mg iron-800 mcg folic acid tablet Take by mouth. 2/14/23   Historical Provider, MD   SUMAtriptan (Imitrex) 100 mg tablet Take 1 tablet (100 mg) by mouth 1 time if needed for migraine. 1/22/25   Jackie Powers MD   tretinoin (Retin-A) 0.05 % cream Apply topically once daily at bedtime. 9/13/21   Historical Provider, MD   Adderall XR 20 mg 24 hr capsule Take 1 capsule (20 mg) by mouth once daily in the morning. 1/8/25 2/7/25  Jackie Powers MD        ROS  Review of Systems       PHYSICAL EXAM:      There were no vitals taken for this visit.     No LMP recorded.      Declines chaperone for physical exam.    PVR=     Well developed, well nourished, in no apparent distress.   Neurologic/Psychiatric:  Awake, Alert and Oriented times 3.  Affect normal. Normal cranial nerves  Pulm: breathing without effort  Sexual maturity: [place] stage V  Abd exam: soft, non-tender      GENITAL/URINARY:       External Genitalia:  The patient has normal appearing external genitalia, normal skenes and bartholins glands, and a normal hair distribution.  Her vulva is without lesions, erythema or discharge.  It is non-tender with appropriate sensation.     Urethral Meatus:  Size normal, Location normal, Lesions absent, Prolapse absent,      Urethra:  Fullness absent, Masses absent,      Bladder:  Fullness absent, Masses absent, Tenderness absent,      Vagina:  General appearance normal, Estrogen effect normal, Discharge absent, Lesions absent, ***     Cervix: Normal, no discharge.   Uterus:  {UTERUS, EXAM:78197}  Adnexa:  {exam; adnexa:67633}    Anus/Perineum:  Lesions absent and Masses absent {Exam; anus:85268}  {Exam; anus and perineum:86901}    Stress urinary incontinence *** demonstrable.       POP-Q  The patient has Stage *** Prolapse.    POP-Q:  Stage: {NUMBERS  0-4:30315}  Position: {SITTING STANDIN}    Aa: ***       Ba: *** C: ***   Gh: *** Pb: *** TVL: ***         Ap: *** Bp: *** D: ***             The patient has {0-5 billie:71864} out of 5 pelvic floor muscle strength.    She {DOES/ DOES NOT:41643} have myofascial tenderness on exam.   Her highest pain score on exam is {NUMBERS 1-10:16161}        She has {0-5 billie:87836} resting anal sphincter and *** squeeze tone.    Rectal exam: ***.     Assessment and Plan:        Bridget Maloney MD

## 2025-02-13 ENCOUNTER — APPOINTMENT (OUTPATIENT)
Dept: OBSTETRICS AND GYNECOLOGY | Facility: CLINIC | Age: 40
End: 2025-02-13
Payer: COMMERCIAL

## 2025-02-13 DIAGNOSIS — N39.9 URINARY DISORDER: Primary | ICD-10-CM

## 2025-03-01 DIAGNOSIS — J01.00 ACUTE NON-RECURRENT MAXILLARY SINUSITIS: Primary | ICD-10-CM

## 2025-03-01 RX ORDER — AZITHROMYCIN 250 MG/1
TABLET, FILM COATED ORAL
Qty: 6 TABLET | Refills: 0 | Status: SHIPPED | OUTPATIENT
Start: 2025-03-01 | End: 2025-03-06

## 2025-03-02 DIAGNOSIS — Z88.1 ALLERGY TO MULTIPLE ANTIBIOTICS: Primary | ICD-10-CM

## 2025-03-07 ENCOUNTER — HOSPITAL ENCOUNTER (OUTPATIENT)
Dept: RADIOLOGY | Facility: CLINIC | Age: 40
Discharge: HOME | End: 2025-03-07
Payer: COMMERCIAL

## 2025-03-07 ENCOUNTER — OFFICE VISIT (OUTPATIENT)
Dept: ORTHOPEDIC SURGERY | Facility: CLINIC | Age: 40
End: 2025-03-07
Payer: COMMERCIAL

## 2025-03-07 VITALS — HEIGHT: 68 IN | WEIGHT: 180 LBS | BODY MASS INDEX: 27.28 KG/M2

## 2025-03-07 DIAGNOSIS — S46.012A ROTATOR CUFF STRAIN, LEFT, INITIAL ENCOUNTER: Primary | ICD-10-CM

## 2025-03-07 DIAGNOSIS — M25.512 LEFT SHOULDER PAIN, UNSPECIFIED CHRONICITY: ICD-10-CM

## 2025-03-07 PROCEDURE — 73030 X-RAY EXAM OF SHOULDER: CPT | Mod: LT

## 2025-03-07 ASSESSMENT — PAIN - FUNCTIONAL ASSESSMENT: PAIN_FUNCTIONAL_ASSESSMENT: NO/DENIES PAIN

## 2025-03-07 NOTE — PROGRESS NOTES
CHIEF COMPLAINT:   Chief Complaint   Patient presents with    Left Shoulder - Pain       History: 39 y.o. female presents to the office today for a left shoulder injury, DOI: 2/23/2025. Pain is anterior and sometimes travels down to the elbow. Hx of a left clavicle ORIF and hardware removal in early 2/2021 and 4/2022. No Hx of injections to the area. Denies numbness/tingling. Denies associated neck pain. No diabetes. XR done today. Advil PRN.        Past medical history, past surgical history, medications, allergies, family history, social history, and review of systems were reviewed today.    A 12 point review of systems was negative other than as stated in the HPI.    Past Medical History:   Diagnosis Date    Acute candidiasis of vulva and vagina 01/19/2017    Vaginal candidiasis    Acute maxillary sinusitis, unspecified 11/05/2022    Sinusitis, acute, maxillary    Acute vaginitis 09/28/2020    Bacterial vaginitis    Cervical disc disorder, unspecified, unspecified cervical region 06/30/2020    Disorder of intervertebral disc of cervical spine    Cutaneous abscess of unspecified foot 04/25/2014    Toe abscess    Dermatitis, unspecified 04/13/2017    Eczema of left hand    Displaced fracture of shaft of left clavicle, initial encounter for closed fracture 10/11/2021    Closed displaced fracture of shaft of left clavicle, initial encounter    Dyshidrosis (pompholyx) 04/08/2019    Dyshidrosis    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    Hypoxia 05/16/2023    Iliotibial band syndrome, right leg 09/11/2019    Iliotibial band syndrome of right side    Local infection of the skin and subcutaneous tissue, unspecified 04/25/2014    Toe infection    Localized swelling, mass and lump, head 10/14/2021    Localized swelling, mass and lump, head    Otalgia, right ear 02/01/2017    Chronic right ear pain    Other chest pain 01/15/2018    Chest pain, localized    Other intervertebral disc displacement, lumbar region  10/20/2021    Lumbar disc herniation    Other specified symptoms and signs involving the circulatory and respiratory systems 10/11/2016    Globus sensation    Other sprain of right hip, initial encounter 05/05/2020    Tear of right acetabular labrum, initial encounter    Other symptoms and signs involving the musculoskeletal system 09/27/2021    Weakness of right hip    Pain in left finger(s) 03/20/2018    Thumb pain, left    Pain in right hip 01/26/2021    Hip pain, right    Pain in unspecified knee     Joint pain, knee    Pelvic and perineal pain 03/23/2020    Pain, pelvic, female    Personal history of diseases of the skin and subcutaneous tissue 11/14/2019    History of pustular acne    Personal history of other benign neoplasm 01/26/2021    History of other benign neoplasm    Personal history of other diseases of the digestive system     History of hemorrhoids    Personal history of other diseases of the female genital tract 06/30/2020    History of dysfunctional uterine bleeding    Personal history of other diseases of the musculoskeletal system and connective tissue 08/14/2013    History of low back pain    Personal history of other diseases of the respiratory system 04/13/2017    History of acute bronchitis    Personal history of other endocrine, nutritional and metabolic disease 03/23/2020    History of thyroid nodule    Personal history of other endocrine, nutritional and metabolic disease 03/09/2020    History of hyperthyroidism    Personal history of other infectious and parasitic diseases 04/08/2019    History of traveler's diarrhea    Rash and other nonspecific skin eruption 02/09/2021    Rash    Right upper quadrant pain 12/23/2017    Colicky right upper quadrant pain    Segmental and somatic dysfunction of pelvic region 01/30/2020    Segmental and somatic dysfunction of pelvic region    Sprain of metacarpophalangeal joint of left thumb, initial encounter 03/25/2018    Sprain of metacarpophalangeal  (MCP) joint of left thumb, initial encounter    Strain of muscle, fascia and tendon of right hip, initial encounter 05/27/2020    Tear of right gluteus medius tendon    Thyrotoxicosis, unspecified without thyrotoxic crisis or storm 08/25/2022    Subclinical hyperthyroidism    Trochanteric bursitis, right hip 05/27/2020    Trochanteric bursitis of right hip    Unspecified injury of left wrist, hand and finger(s), subsequent encounter 05/08/2018    Injury of left thumb, subsequent encounter    Urinary tract infection, site not specified 08/19/2021    Acute UTI        Allergies   Allergen Reactions    Adhesive Tape-Silicones Rash     paper ok .adhesive and bandaids cause skin reaction    Amoxicillin-Pot Clavulanate Rash    Cefdinir Rash    Ciprofloxacin Rash    House Dust Rash    Levofloxacin Rash    Penicillins Rash        Past Surgical History:   Procedure Laterality Date    OTHER SURGICAL HISTORY  10/07/2016    Dental Surgery    OTHER SURGICAL HISTORY  09/20/2019    [place] tooth extraction    OTHER SURGICAL HISTORY  09/20/2019    Varicose vein ligation    OTHER SURGICAL HISTORY  01/11/2014    Vaginal Pap smear    SINUS SURGERY  10/07/2016    Sinus Surgery        Family History   Problem Relation Name Age of Onset    Heart disease Mother      Heart attack Mother          acute in her early 50's    Heart failure Mother          in her early 50's    Alcohol abuse Father      Basal cell carcinoma Father      Suicide Attempts Father      Obesity Father      Alcohol abuse Brother      Other (concentration deficit) Brother      Diabetes Mother's Sister      Diabetes Mother's Brother      Breast cancer Other Paternal cousin     Lung cancer Other Grandmother     Skin cancer Other Grandmother         Social History     Socioeconomic History    Marital status: Single     Spouse name: Not on file    Number of children: Not on file    Years of education: Not on file    Highest education level: Not on file   Occupational History     Not on file   Tobacco Use    Smoking status: Never    Smokeless tobacco: Never   Vaping Use    Vaping status: Never Used   Substance and Sexual Activity    Alcohol use: Never    Drug use: Never    Sexual activity: Not on file   Other Topics Concern    Not on file   Social History Narrative    Not on file     Social Drivers of Health     Financial Resource Strain: Low Risk  (9/3/2024)    Overall Financial Resource Strain (CARDIA)     Difficulty of Paying Living Expenses: Not hard at all   Food Insecurity: No Food Insecurity (9/3/2024)    Hunger Vital Sign     Worried About Running Out of Food in the Last Year: Never true     Ran Out of Food in the Last Year: Never true   Transportation Needs: No Transportation Needs (9/3/2024)    PRAPARE - Transportation     Lack of Transportation (Medical): No     Lack of Transportation (Non-Medical): No   Physical Activity: Not on file   Stress: No Stress Concern Present (9/3/2024)    [employer] - Occupational Stress Questionnaire     Feeling of Stress : Not at all   Social Connections: Not on file   Intimate Partner Violence: Not At Risk (9/3/2024)    Humiliation, Afraid, Rape, and Kick questionnaire     Fear of Current or Ex-Partner: No     Emotionally Abused: No     Physically Abused: No     Sexually Abused: No        CURRENT MEDICATIONS:   Current Outpatient Medications   Medication Sig Dispense Refill    Adderall XR 20 mg 24 hr capsule Take 1 capsule (20 mg) by mouth once daily in the morning. 30 capsule 0    albuterol 90 mcg/actuation inhaler Inhale 2 puffs. EVERY 4-6 HOURS AS NEEDED      azelastine (Astelin) 137 mcg (0.1 %) nasal spray Administer 1 spray into each nostril 2 times a day. Use in each nostril as directed 30 mL 11    clobetasoL 0.025 % cream Apply 1 Application topically 2 times a day. 60 g 1    diphenhydrAMINE (Benadryl Allergy) 25 mg tablet Take 1 tablet (25 mg) by mouth.      fexofenadine (Allegra) 180 mg tablet Take 1 tablet  "(180 mg) by mouth once daily.      fluticasone (Flonase Sensimist) 27.5 mcg/actuation nasal spray Administer 2 sprays into each nostril once daily.      fluticasone propion-salmeteroL (Advair Diskus) 250-50 mcg/dose diskus inhaler Inhale 1 puff 2 times a day. 60 each 11    ibuprofen 600 mg tablet Take 1 tablet (600 mg) by mouth every 6 hours if needed for mild pain (1 - 3). 60 tablet 1    prenatal vitamin, iron-folic, (Prenatal Vitamin) 27 mg iron-800 mcg folic acid tablet Take by mouth.      SUMAtriptan (Imitrex) 100 mg tablet Take 1 tablet (100 mg) by mouth 1 time if needed for migraine. 9 tablet 1    tretinoin (Retin-A) 0.05 % cream Apply topically once daily at bedtime.       No current facility-administered medications for this visit.       Physical Examination:      9/3/2024     7:50 AM 9/3/2024     1:24 PM 9/3/2024     3:55 PM 9/3/2024     8:00 PM 10/17/2024     1:12 PM 10/31/2024     9:58 AM 3/7/2025    12:26 PM   Vitals   Systolic 93 101 102 117 104 114    Diastolic 56 65 58 80 80 90    BP Location Right arm Left arm  Left arm  Left arm    Heart Rate 70 74 66 72  90    Temp 37.3 °C (99.1 °F) 36.5 °C (97.7 °F) 36.5 °C (97.7 °F) 37 °C (98.6 °F)  35.9 °C (96.7 °F)    Resp 16 16 16 17  12    Height     1.727 m (5' 8\") 1.727 m (5' 8\") 1.727 m (5' 8\")   Weight (lb)     192 191.8 180   BMI     29.19 kg/m2 29.16 kg/m2 27.37 kg/m2   BSA (m2)     2.04 m2 2.04 m2 1.98 m2   Visit Report      Report Report      Body mass index is 27.37 kg/m².    Well-appearing, appears stated age, pleasant and cooperative, appropriate mood and behavior. Height and weight reviewed. Alert and oriented x3.  Auditory function intact.  No acute distress.  Intact ocular function, BRIDGETT, EOMI. Breathing is unlabored .  There is no evidence of jugular venous distension. Skin appearance is normal without evidence of rash or other lesions. 2+ radial pulses bilaterally, fingers pink and wwp, good capillary refill, no pitting edema. No appreciable " lymphadenopathy in bilateral upper extremities. SILT throughout both upper extremities, median/radial/ulnar/musculocutaneous/axillary nerve motor and sensory intact (except for abnormalities noted in focused musculoskeletal exam section below).     Neck exam: Full range of motion of the neck in flexion/extension and rotational movements. No significant areas of tenderness to palpation in the neck.    On exam of bilateral upper extremities, symmetric range of motion and strength of both shoulders.  Active forward flexion to 170, external Tatian of 60, to rotation to T12.  Rotator cuff strength is preserved bilaterally.  Minimal pain with Neer and Garcia of his left shoulder.    Imaging: Radiographs of the left shoulder performed today.  Personally interpreted by myself.  Preserved glenohumeral joint space.  Preserved acromiohumeral interval.  No acute fractures noted.       Assessment: Left rotator cuff strain    Plan: Patient's symptoms, test result and exam are consistent with a diagnosis of rotator cuff strain.  We did discuss the natural history of this.  Conservative management is often the first-line treatment for this, which includes physical therapy, injections and activity modification.  In rare cases of recalcitrant pain, surgery may be indicated, but this is only after extensive nonoperative care.  Patient understands this.  They wish to proceed with PT.      Dragon software was used to dictate this note, please be aware that minor errors in transcription may be present.    Cesar Rosado MD    Shoulder/Elbow Surgery  [facility]

## 2025-03-10 DIAGNOSIS — F90.0 ADHD, PREDOMINANTLY INATTENTIVE TYPE: ICD-10-CM

## 2025-03-10 RX ORDER — DEXTROAMPHETAMINE SULFATE, DEXTROAMPHETAMINE SACCHARATE, AMPHETAMINE SULFATE AND AMPHETAMINE ASPARTATE 5; 5; 5; 5 MG/1; MG/1; MG/1; MG/1
20 CAPSULE, EXTENDED RELEASE ORAL EVERY MORNING
Qty: 30 CAPSULE | Refills: 0 | Status: SHIPPED | OUTPATIENT
Start: 2025-03-10

## 2025-03-31 ENCOUNTER — OFFICE VISIT (OUTPATIENT)
Dept: OBSTETRICS AND GYNECOLOGY | Facility: CLINIC | Age: 40
End: 2025-03-31
Payer: COMMERCIAL

## 2025-03-31 VITALS
SYSTOLIC BLOOD PRESSURE: 109 MMHG | HEIGHT: 68 IN | HEART RATE: 73 BPM | BODY MASS INDEX: 28.31 KG/M2 | WEIGHT: 186.8 LBS | DIASTOLIC BLOOD PRESSURE: 75 MMHG

## 2025-03-31 DIAGNOSIS — N39.9 URINARY DISORDER: Primary | ICD-10-CM

## 2025-03-31 DIAGNOSIS — R10.2 PELVIC PAIN IN FEMALE: ICD-10-CM

## 2025-03-31 DIAGNOSIS — N39.3 STRESS INCONTINENCE: ICD-10-CM

## 2025-03-31 LAB
POC APPEARANCE, URINE: CLEAR
POC BILIRUBIN, URINE: NEGATIVE
POC BLOOD, URINE: ABNORMAL
POC COLOR, URINE: YELLOW
POC GLUCOSE, URINE: NEGATIVE MG/DL
POC KETONES, URINE: NEGATIVE MG/DL
POC LEUKOCYTES, URINE: NEGATIVE
POC NITRITE,URINE: NEGATIVE
POC PH, URINE: 7 PH
POC PROTEIN, URINE: NEGATIVE MG/DL
POC SPECIFIC GRAVITY, URINE: 1.02
POC UROBILINOGEN, URINE: 0.2 EU/DL

## 2025-03-31 PROCEDURE — 1036F TOBACCO NON-USER: CPT | Performed by: OBSTETRICS & GYNECOLOGY

## 2025-03-31 PROCEDURE — 99214 OFFICE O/P EST MOD 30 MIN: CPT | Performed by: OBSTETRICS & GYNECOLOGY

## 2025-03-31 PROCEDURE — 51798 US URINE CAPACITY MEASURE: CPT | Performed by: OBSTETRICS & GYNECOLOGY

## 2025-03-31 PROCEDURE — 81003 URINALYSIS AUTO W/O SCOPE: CPT | Performed by: OBSTETRICS & GYNECOLOGY

## 2025-03-31 PROCEDURE — 99214 OFFICE O/P EST MOD 30 MIN: CPT | Mod: 25 | Performed by: OBSTETRICS & GYNECOLOGY

## 2025-03-31 PROCEDURE — 3008F BODY MASS INDEX DOCD: CPT | Performed by: OBSTETRICS & GYNECOLOGY

## 2025-03-31 ASSESSMENT — ENCOUNTER SYMPTOMS
PSYCHIATRIC NEGATIVE: 1
SHORTNESS OF BREATH: 1
HEADACHES: 1
MUSCULOSKELETAL NEGATIVE: 1
ROS SKIN COMMENTS: VARICOSE VEINS
CARDIOVASCULAR NEGATIVE: 1
GASTROINTESTINAL NEGATIVE: 1
EYES NEGATIVE: 1
ENDOCRINE NEGATIVE: 1
CONSTITUTIONAL NEGATIVE: 1

## 2025-03-31 ASSESSMENT — PAIN SCALES - GENERAL: PAINLEVEL_OUTOF10: 0-NO PAIN
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
## 2025-04-09 DIAGNOSIS — F90.0 ADHD, PREDOMINANTLY INATTENTIVE TYPE: ICD-10-CM

## 2025-04-10 RX ORDER — DEXTROAMPHETAMINE SULFATE, DEXTROAMPHETAMINE SACCHARATE, AMPHETAMINE SULFATE AND AMPHETAMINE ASPARTATE 5; 5; 5; 5 MG/1; MG/1; MG/1; MG/1
20 CAPSULE, EXTENDED RELEASE ORAL EVERY MORNING
Qty: 30 CAPSULE | Refills: 0 | Status: SHIPPED | OUTPATIENT
Start: 2025-04-10

## 2025-04-14 ENCOUNTER — EVALUATION (OUTPATIENT)
Dept: PHYSICAL THERAPY | Facility: CLINIC | Age: 40
End: 2025-04-14
Payer: COMMERCIAL

## 2025-04-14 ENCOUNTER — APPOINTMENT (OUTPATIENT)
Dept: ORTHOPEDIC SURGERY | Facility: CLINIC | Age: 40
End: 2025-04-14
Payer: COMMERCIAL

## 2025-04-14 DIAGNOSIS — N39.9 URINARY DISORDER: ICD-10-CM

## 2025-04-14 DIAGNOSIS — R10.2 PELVIC PAIN IN FEMALE: ICD-10-CM

## 2025-04-14 DIAGNOSIS — M25.512 LEFT SHOULDER PAIN, UNSPECIFIED CHRONICITY: Primary | ICD-10-CM

## 2025-04-14 PROCEDURE — 97110 THERAPEUTIC EXERCISES: CPT | Mod: GP

## 2025-04-14 PROCEDURE — 97535 SELF CARE MNGMENT TRAINING: CPT | Mod: GP

## 2025-04-14 PROCEDURE — 97161 PT EVAL LOW COMPLEX 20 MIN: CPT | Mod: GP

## 2025-04-14 ASSESSMENT — ENCOUNTER SYMPTOMS
LOSS OF SENSATION IN FEET: 0
OCCASIONAL FEELINGS OF UNSTEADINESS: 0
DEPRESSION: 0

## 2025-04-14 NOTE — PROGRESS NOTES
Physical Therapy  Physical Therapy Orthopedic Evaluation    Patient Name: Lashaun Cruz  MRN: [id number]  Today's Date: 4/14/2025  Time Calculation  Start Time: 1430  Stop Time: 1520  Time Calculation (min): 50 min    Insurance:  Visit number: 1 of 20  Authorization info: No Auth  Insurance Type: MMO  Onset date: 3/23/2025    General:  Reason for visit: L Rotator Cuff Strain  Referred by: Cesar Rosado MD    Current Problem  Left shoulder pain, unspecified chronicity  Referral to Physical Therapy       Precautions:   Precautions  STEADI Fall Risk Score (The score of 4 or more indicates an increased risk of falling): 1    Medical History Form: Reviewed (scanned into chart)    Subjective:   Subjective   Chief Complaint: Patient presents to clinic for left shoulder pain following a fall down the stairs.  Quickly reached to grab the railing, and yanked the left shoulder.  Immediate pain.  Symptoms have improved somewhat, but still limited with reaching and lifting.   Onset Date: 3/23/25  KAYODE: Fall    Current Condition:   Better    Pain:  Pain currently: 0/10  Pain level at worst: 4-5/10  Pain level at best: 0/10  Location: Left shoulder  Description: sharp, intermittent  Aggravating Factors: shoulder abduction, shoulder extension, reaching behind back, lifting, putting on back-pack  Relieving Factors:  Rest    Functional Limitations: Lifting    Prior Level of Function (PLOF)  Patient previously independent with all ADLs. Currently at 80% of PLOF.  Exercise/Physical Activity: hiking, yoga (not recently)  Work/School: [employer]  R handed    Relevant Information:  PMH: Left clavicle ORIF (2021) and hardware removal (2022). Thyroid cancer (surgery April 2022). Herniated disc with tethered cord 2020.   Previous Tests/Imaging: Xrays  Previous Interventions/Treatments: None, rest, ice    Patients Living Environment: Reviewed and no concern    Primary Language: English    Patient's Goal(s) for Therapy: Be  back to zero pain in left shoulder    Red Flags: Do you have any of the following? No  Fever/chills, unexplained weight changes, dizziness/fainting, unexplained change in bowel or bladder functions, unexplained malaise or muscle weakness, night pain/sweats, numbness or tingling    Objective:  Objective     Posture: WNL      ROM    Cervical AROM (Degrees)    Flexion: 61  Extension: 74  (L) Side Bend: 49  (R) Side Bend: 47  (L) Rotation: 62, pull in R anterior neck triangle: patient notes this has been tight since thyroid surgery  (R) Rotation: 75      Shoulder AROM (Degrees)      (R)  (L)  Flexion: 165  165   Abduction: 170  165, painful arc     Functional ER: 85  65, tightness L shoulder    Functional IR: T4   T3              Elbow AROM (Degrees)      (R)  (L)  Flexion: WNL  WNL      Extension: 0  0             Strength Testing    Shoulder    (R)  (L)  Flexion: 5  5      Abduction: 5  4+, mild discomfort     ER:  5  5     IR:  5  5         Elbow    (R)  (L)  Flexion: 5  5       Extension: 5  5         Periscapular Musculature    (R)  (L)  Upper Traps: 5  5     Middle Traps: 4  4-     Lower Traps: 4  4-     Rhomboids: 4+  4+         Joint Mobility: hypermobility bilateral GH        Special Tests    RTC/Impingement   Neer Impingement: L (+)   Garcia Cody: L (+)   Empty Can: L (+)   Drop Arm: L (-)   Painful Arc: L (+)   Lift Off Test: L (-)  AC Joint   Cross Arm Test: L (-)  Instability   Sulcus Test: L (+)     Radicular Symptoms: (-)      Outcome Measures:  Other Measures  Disability of Arm Shoulder Hand (DASH): 20%       Goals: Set and discussed today  Active       PT Problem       PT Goal 1       Start:  04/14/25    Expected End:  04/28/25       Demonstrate HEP adherence to promote pain reduction and functional gains.         PT Goal 2       Start:  04/14/25    Expected End:  06/09/25       Left shoulder ROM WNL and without pain >1-2/10.  Left shoulder strength measures of 5/5.  Don/doff clothing without pain  >1/10 in left shoulder.  Do household duties without difficulty and without pain > 1/10 in left shoulder.  Sleep without interruption due to left shoulder pain.  Lift 15lbs overhead without pain >1/10  Patient will rate pain < 2/10 at worst to improve activity tolerance.               Plan of care was developed with input and agreement by the patient    Treatment Performed:    Therapeutic Exercise:    15 min  Instructed in personalized Home Exercise program  Access Code: [email or web address]  URL: [email or web address]  Date: 04/14/2025  Prepared by: Shima Skelton    Exercises  - Standing Isometric Shoulder Abduction with Doorway - Arm Bent  - 1-2 x daily - 7 x weekly - 5 reps - 15 seconds hold  - Isometric Shoulder External Rotation with Ball at Wall  - 1-2 x daily - 7 x weekly - 5 reps - 15 seconds hold  - Prone Scapular Retraction  - 1 x daily - 7 x weekly - 2 sets - 10 reps - 5 seconds hold  - Prone Scapular Retraction Y  - 1 x daily - 7 x weekly - 3 sets - 10 reps  - Plank on Forearms with Scapular Protraction Retraction AROM  - 1 x daily - 7 x weekly - 3 reps - 30 seconds hold  - Shoulder W - External Rotation with Resistance  - 1 x daily - 7 x weekly - 3 sets - 10 reps    Self Care/Education:     10 min  Evaluation findings  Plan of care  RTC anatomy and function; force couple of scapular muscles      Charges: LE Eval x 1, TE x 1, Self Care x 1    Assessment: Patient presents to PT with signs and symptoms consistent with L RTC strain.  Upon examination, patient demonstrates weakness in scapular stabilizers, minor RTC weakness, (+) glenohumeral instability, (+) painful arc.  Patient would benefit from course of PT to address these impairments, reduce pain, and assist patient in returning to PLOF.         Clinical Presentation: Stable and/or uncomplicated characteristics  Level of Complexity: Low    Plan:     Planned Interventions include: therapeutic exercise, self-care home management, manual  therapy, therapeutic activities, neuromuscular coordination, vasopneumatic, dry needling  Frequency: 1-2 x Week  Duration: 8 Weeks  Rehab Potential/Prognosis: Good    Shima Skelton, PT

## 2025-04-21 DIAGNOSIS — J06.9 ACUTE URI: Primary | ICD-10-CM

## 2025-04-21 RX ORDER — AZITHROMYCIN 250 MG/1
TABLET, FILM COATED ORAL
Qty: 6 TABLET | Refills: 0 | Status: SHIPPED | OUTPATIENT
Start: 2025-04-21 | End: 2025-04-26

## 2025-04-22 ENCOUNTER — APPOINTMENT (OUTPATIENT)
Dept: PHYSICAL THERAPY | Facility: CLINIC | Age: 40
End: 2025-04-22
Payer: COMMERCIAL

## 2025-04-22 ENCOUNTER — DOCUMENTATION (OUTPATIENT)
Dept: PHYSICAL THERAPY | Facility: CLINIC | Age: 40
End: 2025-04-22
Payer: COMMERCIAL

## 2025-04-22 NOTE — PROGRESS NOTES
Physical Therapy                 Therapy Communication Note    Patient Name: Lashaun Cruz  MRN: [id number]  Department:   Room: Room/bed info not found  Today's Date: 4/22/2025     Discipline: Physical Therapy          Missed Visit Reason:  Patient called and cancelled due to illness.    Missed Time: Cancel    Comment:

## 2025-04-28 ENCOUNTER — TREATMENT (OUTPATIENT)
Dept: PHYSICAL THERAPY | Facility: CLINIC | Age: 40
End: 2025-04-28
Payer: COMMERCIAL

## 2025-04-28 DIAGNOSIS — M25.512 LEFT SHOULDER PAIN, UNSPECIFIED CHRONICITY: Primary | ICD-10-CM

## 2025-04-28 PROCEDURE — 97110 THERAPEUTIC EXERCISES: CPT | Mod: GP

## 2025-04-28 ASSESSMENT — PAIN - FUNCTIONAL ASSESSMENT: PAIN_FUNCTIONAL_ASSESSMENT: 0-10

## 2025-04-28 ASSESSMENT — PAIN SCALES - GENERAL: PAINLEVEL_OUTOF10: 0 - NO PAIN

## 2025-04-28 NOTE — PROGRESS NOTES
"  Physical Therapy  Physical Therapy Treatment Note    Patient Name: Lashaun Cruz  MRN: [id number]  Today's Date: 4/28/2025  Time Calculation  Start Time: 1052  Stop Time: 1130  Time Calculation (min): 38 min    Insurance:  Visit number: 2 of 20  Authorization info: No Auth  Insurance Type: MMO  Onset date: 3/23/2025     General:  Reason for visit: L Rotator Cuff Strain  Referred by: Cesar Rosado MD    Current Problem  1. Left shoulder pain, unspecified chronicity            Precautions: STEADI Fall Risk Score (The score of 4 or more indicates an increased risk of falling): 1       Subjective:     Patient reports some improvement in movement in the left shoulder.       Had dry needling to the anterior neck on Thursday.     Pain  Pain Assessment: 0-10  0-10 (Numeric) Pain Score: 0 - No pain    Performing HEP?: Yes      Objective:   Demonstrated good AROM elevation of L shoulder      Treatment Performed:    Therapeutic Exercise:    38 min  UBE L3 3'F/2'R  W shoulder ER with scapular retraction blue tband 3x10  Standing shoulder horizontal abduction with blue tband 3x10  Shoulder Scaption 3# 3x10  Shoulder flexion 3# 3x10  Serratus activation with shoulder flexion, green tband loop 2x8  Prone on Tball T's 0# 2x10  Prone on Tball Y's 0# 2x10  Low trap setting at wall with blue tband 3x10  Plank on forearms with scapular protraction AROM 3 x 30\"  Side plank 2x30\" R/L        Personalized Home Exercise Program:  Access Code: [email or web address]  URL: [email or web address]  Date: 04/14/2025  Prepared by: Shima Skelton     Exercises  - Standing Isometric Shoulder Abduction with Doorway - Arm Bent  - 1-2 x daily - 7 x weekly - 5 reps - 15 seconds hold  - Isometric Shoulder External Rotation with Ball at Wall  - 1-2 x daily - 7 x weekly - 5 reps - 15 seconds hold  - Prone Scapular Retraction  - 1 x daily - 7 x weekly - 2 sets - 10 reps - 5 seconds hold  - Prone Scapular Retraction Y  - 1 x daily - 7 x " weekly - 3 sets - 10 reps  - Plank on Forearms with Scapular Protraction Retraction AROM  - 1 x daily - 7 x weekly - 3 reps - 30 seconds hold  - Shoulder W - External Rotation with Resistance  - 1 x daily - 7 x weekly - 3 sets - 10 reps     Charges: TE    Assessment:   Did well with progression of shoulder strengthening exercises.  Moderate fatigue but without pain production.     Plan:  Continue to increase RTC and scapular muscle strength and dynamic stability to reduce pain and improve function.  Progress HEP.       Shima Skelton, PT
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
## 2025-04-30 ENCOUNTER — APPOINTMENT (OUTPATIENT)
Dept: PRIMARY CARE | Facility: CLINIC | Age: 40
End: 2025-04-30
Payer: COMMERCIAL

## 2025-04-30 VITALS
SYSTOLIC BLOOD PRESSURE: 120 MMHG | WEIGHT: 180 LBS | HEART RATE: 62 BPM | OXYGEN SATURATION: 98 % | BODY MASS INDEX: 27.28 KG/M2 | DIASTOLIC BLOOD PRESSURE: 60 MMHG | HEIGHT: 68 IN

## 2025-04-30 DIAGNOSIS — E03.9 HYPOTHYROIDISM, UNSPECIFIED TYPE: ICD-10-CM

## 2025-04-30 DIAGNOSIS — C73 PAPILLARY CARCINOMA OF THYROID: ICD-10-CM

## 2025-04-30 DIAGNOSIS — F90.0 ATTENTION DEFICIT HYPERACTIVITY DISORDER (ADHD), PREDOMINANTLY INATTENTIVE TYPE: ICD-10-CM

## 2025-04-30 DIAGNOSIS — Z13.220 LIPID SCREENING: ICD-10-CM

## 2025-04-30 DIAGNOSIS — Q21.12 PATENT FORAMEN OVALE (HHS-HCC): ICD-10-CM

## 2025-04-30 DIAGNOSIS — J45.20 MILD INTERMITTENT ASTHMA WITHOUT COMPLICATION (HHS-HCC): ICD-10-CM

## 2025-04-30 DIAGNOSIS — Z51.81 ENCOUNTER FOR THERAPEUTIC DRUG MONITORING: ICD-10-CM

## 2025-04-30 DIAGNOSIS — L30.9 CHRONIC ECZEMA OF HAND: ICD-10-CM

## 2025-04-30 DIAGNOSIS — J45.909 ASTHMA AFFECTING PREGNANCY, ANTEPARTUM (HHS-HCC): ICD-10-CM

## 2025-04-30 DIAGNOSIS — J45.21 MILD INTERMITTENT ASTHMA WITH ACUTE EXACERBATION (HHS-HCC): ICD-10-CM

## 2025-04-30 DIAGNOSIS — Z00.00 HEALTHCARE MAINTENANCE: Primary | ICD-10-CM

## 2025-04-30 DIAGNOSIS — F90.0 ADHD, PREDOMINANTLY INATTENTIVE TYPE: ICD-10-CM

## 2025-04-30 DIAGNOSIS — Z13.1 DIABETES MELLITUS SCREENING: ICD-10-CM

## 2025-04-30 DIAGNOSIS — D80.3 SELECTIVE DEFICIENCY OF IGG (MULTI): ICD-10-CM

## 2025-04-30 DIAGNOSIS — J38.3 DISORDER OF VOCAL CORD: ICD-10-CM

## 2025-04-30 DIAGNOSIS — O99.519 ASTHMA AFFECTING PREGNANCY, ANTEPARTUM (HHS-HCC): ICD-10-CM

## 2025-04-30 DIAGNOSIS — Z79.899 LONG-TERM CURRENT USE OF STIMULANT: ICD-10-CM

## 2025-04-30 PROCEDURE — 3008F BODY MASS INDEX DOCD: CPT | Performed by: INTERNAL MEDICINE

## 2025-04-30 PROCEDURE — 1036F TOBACCO NON-USER: CPT | Performed by: INTERNAL MEDICINE

## 2025-04-30 PROCEDURE — 99395 PREV VISIT EST AGE 18-39: CPT | Performed by: INTERNAL MEDICINE

## 2025-04-30 RX ORDER — DEXTROAMPHETAMINE SULFATE, DEXTROAMPHETAMINE SACCHARATE, AMPHETAMINE SULFATE AND AMPHETAMINE ASPARTATE 5; 5; 5; 5 MG/1; MG/1; MG/1; MG/1
20 CAPSULE, EXTENDED RELEASE ORAL EVERY MORNING
Qty: 30 CAPSULE | Refills: 0 | Status: SHIPPED | OUTPATIENT
Start: 2025-04-30

## 2025-04-30 RX ORDER — CLOBETASOL PROPIONATE 0.25 MG/G
1 CREAM TOPICAL 2 TIMES DAILY
Qty: 60 G | Refills: 1 | Status: SHIPPED | OUTPATIENT
Start: 2025-04-30 | End: 2025-05-01 | Stop reason: RX

## 2025-04-30 NOTE — PATIENT INSTRUCTIONS
Current weight: 81.6 kg (180 lb)  Weight change since last visit (-) denotes wt loss -6.8 lbs   Weight loss needed to achieve BMI 25: 15.9 Lbs  Weight loss needed to achieve BMI 30: -16.9 Lbs    It was a pleasure to see you today.  I would like to remind you about importance of a healthy lifestyle in order to improve your well-being and live longer. Try to engage in physical activities for at least 150 minutes per week.  Eat about 10 servings of fruits and vegetables daily. My advice is 2 servings of fruits and 8 servings of vegetables. For vegetables choose at least half of them green and at least half of them fresh.  Please avoid sugar, salt, fried food and saturated fat.  Weight loss is advised. Target BMI: below 25. Please follow low carbohydrate diet and daily exercise routine for at least 30 minutes. Nutritional consultation is available, please let me know if you are interested. I will be happy to discuss details with you if interested.   Have a good day and stay well.      The ability to age comfortably depends on how you invest in your body.   Include physical activity in your daily routine. Physical activity increases blood flow to your whole body, including your brain. ...  Eat a healthy diet. A heart-healthy diet may benefit your brain.   Stay mentally active. Be social.   Treat cardiovascular disease.  No smoking, excessive EtOH intake or illicit drug use.

## 2025-05-01 ENCOUNTER — TELEPHONE (OUTPATIENT)
Dept: PRIMARY CARE | Facility: CLINIC | Age: 40
End: 2025-05-01
Payer: COMMERCIAL

## 2025-05-01 DIAGNOSIS — L30.9 CHRONIC ECZEMA OF HAND: ICD-10-CM

## 2025-05-01 DIAGNOSIS — G43.001 MIGRAINE WITHOUT AURA AND WITH STATUS MIGRAINOSUS, NOT INTRACTABLE: ICD-10-CM

## 2025-05-01 DIAGNOSIS — J06.9 ACUTE URI: ICD-10-CM

## 2025-05-01 LAB
ALBUMIN SERPL-MCNC: 4.8 G/DL (ref 3.6–5.1)
ALP SERPL-CCNC: 78 U/L (ref 31–125)
ALT SERPL-CCNC: 10 U/L (ref 6–29)
ANION GAP SERPL CALCULATED.4IONS-SCNC: 10 MMOL/L (CALC) (ref 7–17)
AST SERPL-CCNC: 17 U/L (ref 10–30)
BILIRUB SERPL-MCNC: 0.7 MG/DL (ref 0.2–1.2)
BUN SERPL-MCNC: 7 MG/DL (ref 7–25)
CALCIUM SERPL-MCNC: 9.5 MG/DL (ref 8.6–10.2)
CHLORIDE SERPL-SCNC: 101 MMOL/L (ref 98–110)
CHOLEST SERPL-MCNC: 212 MG/DL
CHOLEST/HDLC SERPL: 2.5 (CALC)
CO2 SERPL-SCNC: 29 MMOL/L (ref 20–32)
CREAT SERPL-MCNC: 0.67 MG/DL (ref 0.5–0.97)
EGFRCR SERPLBLD CKD-EPI 2021: 115 ML/MIN/1.73M2
ERYTHROCYTE [DISTWIDTH] IN BLOOD BY AUTOMATED COUNT: 11.2 % (ref 11–15)
EST. AVERAGE GLUCOSE BLD GHB EST-MCNC: 103 MG/DL
EST. AVERAGE GLUCOSE BLD GHB EST-SCNC: 5.7 MMOL/L
GLUCOSE SERPL-MCNC: 77 MG/DL (ref 65–99)
HBA1C MFR BLD: 5.2 %
HCT VFR BLD AUTO: 43.8 % (ref 35–45)
HDLC SERPL-MCNC: 86 MG/DL
HGB BLD-MCNC: 14.5 G/DL (ref 11.7–15.5)
LDLC SERPL CALC-MCNC: 109 MG/DL (CALC)
MCH RBC QN AUTO: 30.4 PG (ref 27–33)
MCHC RBC AUTO-ENTMCNC: 33.1 G/DL (ref 32–36)
MCV RBC AUTO: 91.8 FL (ref 80–100)
NONHDLC SERPL-MCNC: 126 MG/DL (CALC)
PLATELET # BLD AUTO: 329 THOUSAND/UL (ref 140–400)
PMV BLD REES-ECKER: 10.1 FL (ref 7.5–12.5)
POTASSIUM SERPL-SCNC: 4.6 MMOL/L (ref 3.5–5.3)
PROT SERPL-MCNC: 7.1 G/DL (ref 6.1–8.1)
RBC # BLD AUTO: 4.77 MILLION/UL (ref 3.8–5.1)
SODIUM SERPL-SCNC: 140 MMOL/L (ref 135–146)
TRIGL SERPL-MCNC: 81 MG/DL
TSH SERPL-ACNC: 1.82 MIU/L
WBC # BLD AUTO: 7.2 THOUSAND/UL (ref 3.8–10.8)

## 2025-05-01 RX ORDER — SUMATRIPTAN SUCCINATE 100 MG/1
100 TABLET ORAL ONCE AS NEEDED
Qty: 9 TABLET | Refills: 1 | Status: SHIPPED | OUTPATIENT
Start: 2025-05-01

## 2025-05-01 RX ORDER — CLOBETASOL PROPIONATE 0.5 MG/G
CREAM TOPICAL 2 TIMES DAILY
Qty: 15 G | Refills: 1 | Status: SHIPPED | OUTPATIENT
Start: 2025-05-01 | End: 2025-12-27

## 2025-05-01 RX ORDER — AZITHROMYCIN 250 MG/1
TABLET, FILM COATED ORAL
Qty: 6 TABLET | Refills: 0 | Status: SHIPPED | OUTPATIENT
Start: 2025-05-01 | End: 2025-05-06

## 2025-05-01 NOTE — TELEPHONE ENCOUNTER
Pt called stating she think she screwed up fast lab work, would like to talk to [employer]    Please call pt at  [phone]

## 2025-05-02 LAB
1OH-MIDAZOLAM UR-MCNC: NEGATIVE NG/ML
7AMINOCLONAZEPAM UR-MCNC: NEGATIVE NG/ML
A-OH ALPRAZ UR-MCNC: NEGATIVE NG/ML
A-OH-TRIAZOLAM UR-MCNC: NEGATIVE NG/ML
AMPHET UR-MCNC: 700 NG/ML
AMPHETAMINES UR QL: POSITIVE NG/ML
BARBITURATES UR QL: NEGATIVE NG/ML
BZE UR QL: NEGATIVE NG/ML
CODEINE UR-MCNC: NEGATIVE NG/ML
CREAT UR-MCNC: 38.8 MG/DL
DRUG SCREEN COMMENT UR-IMP: ABNORMAL
EDDP UR-MCNC: NEGATIVE NG/ML
FENTANYL UR-MCNC: NEGATIVE NG/ML
HYDROCODONE UR-MCNC: NEGATIVE NG/ML
HYDROMORPHONE UR-MCNC: NEGATIVE NG/ML
LORAZEPAM UR-MCNC: NEGATIVE NG/ML
METHADONE UR-MCNC: NEGATIVE NG/ML
METHAMPHET UR-MCNC: NEGATIVE NG/ML
MORPHINE UR-MCNC: NEGATIVE NG/ML
NORDIAZEPAM UR-MCNC: NEGATIVE NG/ML
NORFENTANYL UR-MCNC: NEGATIVE NG/ML
NORHYDROCODONE UR CFM-MCNC: NEGATIVE NG/ML
NOROXYCODONE UR CFM-MCNC: NEGATIVE NG/ML
NORTRAMADOL UR-MCNC: NEGATIVE NG/ML
OH-ETHYLFLURAZ UR-MCNC: NEGATIVE NG/ML
OXAZEPAM UR-MCNC: NEGATIVE NG/ML
OXIDANTS UR QL: NEGATIVE MCG/ML
OXYCODONE UR CFM-MCNC: NEGATIVE NG/ML
OXYMORPHONE UR CFM-MCNC: NEGATIVE NG/ML
PCP UR QL: NEGATIVE NG/ML
PH UR: 7.5 [PH] (ref 4.5–9)
QUEST 6 ACETYLMORPHINE: NEGATIVE NG/ML
QUEST NOTES AND COMMENTS: ABNORMAL
QUEST ZOLPIDEM: NEGATIVE NG/ML
TEMAZEPAM UR-MCNC: NEGATIVE NG/ML
THC UR QL: NEGATIVE NG/ML
TRAMADOL UR-MCNC: NEGATIVE NG/ML
ZOLPIDEM PHENYL-4-CARB UR CFM-MCNC: NEGATIVE NG/ML

## 2025-05-03 NOTE — RESULT ENCOUNTER NOTE
Lalit Wilks=  The cholesterol is adequate with great HDL   Thyroid is great range   Glucose is good   Rest of the labs are good range

## 2025-05-05 DIAGNOSIS — R05.1 ACUTE COUGH: Primary | ICD-10-CM

## 2025-05-05 RX ORDER — BENZONATATE 200 MG/1
200 CAPSULE ORAL 3 TIMES DAILY PRN
Qty: 42 CAPSULE | Refills: 0 | Status: SHIPPED | OUTPATIENT
Start: 2025-05-05 | End: 2025-06-04

## 2025-05-07 ENCOUNTER — APPOINTMENT (OUTPATIENT)
Dept: PHYSICAL THERAPY | Facility: CLINIC | Age: 40
End: 2025-05-07
Payer: COMMERCIAL

## 2025-05-07 ENCOUNTER — DOCUMENTATION (OUTPATIENT)
Dept: PHYSICAL THERAPY | Facility: CLINIC | Age: 40
End: 2025-05-07
Payer: COMMERCIAL

## 2025-05-07 NOTE — PROGRESS NOTES
Physical Therapy                 Therapy Communication Note    Patient Name: Lashaun Cruz  MRN: [id number]  Department:   Room: Room/bed info not found  Today's Date: 5/7/2025     Discipline: Physical Therapy          Missed Visit Reason:  Patient called and cancelled due to illness.    Missed Time: Cancel    Comment:

## 2025-05-10 DIAGNOSIS — F90.0 ADHD, PREDOMINANTLY INATTENTIVE TYPE: ICD-10-CM

## 2025-05-10 RX ORDER — DEXTROAMPHETAMINE SULFATE, DEXTROAMPHETAMINE SACCHARATE, AMPHETAMINE SULFATE AND AMPHETAMINE ASPARTATE 5; 5; 5; 5 MG/1; MG/1; MG/1; MG/1
20 CAPSULE, EXTENDED RELEASE ORAL EVERY MORNING
Qty: 30 CAPSULE | Refills: 0 | Status: SHIPPED | OUTPATIENT
Start: 2025-05-10

## 2025-05-14 ENCOUNTER — TREATMENT (OUTPATIENT)
Dept: PHYSICAL THERAPY | Facility: CLINIC | Age: 40
End: 2025-05-14
Payer: COMMERCIAL

## 2025-05-14 DIAGNOSIS — M25.512 LEFT SHOULDER PAIN: Primary | ICD-10-CM

## 2025-05-14 DIAGNOSIS — J45.21 MILD INTERMITTENT ASTHMA WITH ACUTE EXACERBATION (HHS-HCC): Primary | ICD-10-CM

## 2025-05-14 PROCEDURE — 97112 NEUROMUSCULAR REEDUCATION: CPT | Mod: GP,CQ | Performed by: SPECIALIST/TECHNOLOGIST

## 2025-05-14 PROCEDURE — 97110 THERAPEUTIC EXERCISES: CPT | Mod: GP,CQ | Performed by: SPECIALIST/TECHNOLOGIST

## 2025-05-14 RX ORDER — METHYLPREDNISOLONE 4 MG/1
TABLET ORAL
Qty: 21 TABLET | Refills: 0 | Status: SHIPPED | OUTPATIENT
Start: 2025-05-14 | End: 2025-05-20

## 2025-05-14 RX ORDER — DOXYCYCLINE 100 MG/1
100 CAPSULE ORAL 2 TIMES DAILY
Qty: 14 CAPSULE | Refills: 0 | Status: SHIPPED | OUTPATIENT
Start: 2025-05-14 | End: 2025-05-21

## 2025-05-14 ASSESSMENT — PAIN - FUNCTIONAL ASSESSMENT: PAIN_FUNCTIONAL_ASSESSMENT: 0-10

## 2025-05-14 ASSESSMENT — PAIN SCALES - GENERAL: PAINLEVEL_OUTOF10: 0 - NO PAIN

## 2025-05-14 NOTE — PROGRESS NOTES
Physical Therapy  Physical Therapy Treatment Note    Patient Name: Lashaun Cruz  MRN: [id number]  Today's Date: 5/14/2025  Time Calculation  Start Time: 1630  Stop Time: 1718  Time Calculation (min): 48 min    Insurance:  Visit number: 3 of 20  Authorization info: No Auth  Insurance Type: MMO  Onset date: 3/23/2025     General:  Reason for visit: L Rotator Cuff Strain  Referred by: Cesar Rosado MD    Current Problem  1. Left shoulder pain              Precautions: STEADI Fall Risk Score (The score of 4 or more indicates an increased risk of falling): 1       Subjective:     Patient reports no pain at rest although still has intermittent pain especially when picking up child.        Pain  Pain Assessment: 0-10  0-10 (Numeric) Pain Score: 0 - No pain    Performing HEP?: Yes    Objective:   Demonstrated good AROM elevation of L shoulder  B winged scapula    Treatment Performed:    Therapeutic Exercise:    38 min  UBE L3 3'F/2'R  Bravo Row 7.5# 20x, stand ext 5# 20x  W shoulder ER with scapular retraction blue tband 2x10  Standing shoulder horizontal abduction with blue tband 2x10  Seated Lats 20# 20x  Wall slide stability 4 pos 10x R/L   Wall push ups 15x  2 kg KB bottoms up carry   Prone on Tball T's 0# 2x10  Prone on Tball Y's 0# 2x10  PB walk to bridge 10x10s  PB walk to plank 10x10s    Updated HEP to add prime  and PB work        Personalized Home Exercise Program:  Access Code: [email or web address]  URL: [email or web address]  Date: 05/14/2025  Prepared by: Roger Sprague    Exercises  - Standing Isometric Shoulder Abduction with Doorway - Arm Bent  - 1-2 x daily - 7 x weekly - 5 reps - 15 seconds hold  - Isometric Shoulder External Rotation with Ball at Wall  - 1-2 x daily - 7 x weekly - 5 reps - 15 seconds hold  - Prone Scapular Retraction  - 1 x daily - 7 x weekly - 2 sets - 10 reps - 5 seconds hold  - Prone Scapular Retraction Y  - 1 x daily - 7 x weekly - 3 sets - 10 reps  - Plank on  Forearms with Scapular Protraction Retraction AROM  - 1 x daily - 7 x weekly - 3 reps - 30 seconds hold  - Shoulder W - External Rotation with Resistance  - 1 x daily - 7 x weekly - 3 sets - 10 reps  - Prone Lower Trapezius Strengthening on Swiss Ball  - 1 x daily - 7 x weekly - 3 sets - 10 reps  - Prone Middle Trapezius Strengthening on Swiss Ball  - 1 x daily - 7 x weekly - 3 sets - 10 reps  - Standing Shoulder Flexion to 90 Degrees with Dumbbells  - 1 x daily - 7 x weekly - 3 sets - 10 reps  - Scaption with Dumbbells  - 1 x daily - 7 x weekly - 3 sets - 10 reps  - Standing Shoulder Abduction with Dumbbells  - 1 x daily - 7 x weekly - 3 sets - 10 reps     Charges: TE x2, NME     Assessment:   Patient tolerated intensity with mild fatigue noting challenge of scapular stability.      Plan:  Continue to increase RTC and scapular muscle strength and dynamic stability to reduce pain and improve function.         Roger Sprague, PTA

## 2025-05-19 ENCOUNTER — TREATMENT (OUTPATIENT)
Dept: PHYSICAL THERAPY | Facility: CLINIC | Age: 40
End: 2025-05-19
Payer: COMMERCIAL

## 2025-05-19 DIAGNOSIS — M25.512 LEFT SHOULDER PAIN, UNSPECIFIED CHRONICITY: Primary | ICD-10-CM

## 2025-05-19 NOTE — PROGRESS NOTES
(Note opened as patient arrived for appointment then cancelled due to schedule conflict)    Therapy Communication Note    Patient Name: Lashaun Cruz  MRN: [id number]  Department:   Rehab Services  Today's Date: 5/20/2025     Discipline: Physical Therapy    Missed Visit Reason: Has another appointment that she forgot about    Missed Type: Cancel    Comment:

## 2025-05-28 ENCOUNTER — TREATMENT (OUTPATIENT)
Dept: PHYSICAL THERAPY | Facility: CLINIC | Age: 40
End: 2025-05-28
Payer: COMMERCIAL

## 2025-05-28 DIAGNOSIS — M25.512 LEFT SHOULDER PAIN, UNSPECIFIED CHRONICITY: Primary | ICD-10-CM

## 2025-05-28 PROCEDURE — 97110 THERAPEUTIC EXERCISES: CPT | Mod: GP

## 2025-05-28 ASSESSMENT — PAIN SCALES - GENERAL: PAINLEVEL_OUTOF10: 0 - NO PAIN

## 2025-05-28 NOTE — PROGRESS NOTES
"  Physical Therapy  Physical Therapy Treatment Note    Patient Name: Lashaun Cruz  MRN: [id number]  Today's Date: 5/28/2025  Time Calculation  Start Time: 1405  Stop Time: 1450  Time Calculation (min): 45 min    Insurance:  Visit number: 4 of 20  Authorization info: No Auth  Insurance Type: MMO  Onset date: 3/23/2025     General:  Reason for visit: L Rotator Cuff Strain  Referred by: Cesar Rosado MD    Current Problem  1. Left shoulder pain, unspecified chronicity              Precautions: STEADI Fall Risk Score (The score of 4 or more indicates an increased risk of falling): 1       Subjective:     Patient reports some improvement in mobility and pain frequency in the shoulder.  Still notes that the shoulder can be painful when carrying her child.  It also fatigues quicker than the right.     Pain  0-10 (Numeric) Pain Score: 0 - No pain    Performing HEP?: Yes      Objective:   Demonstrated good AROM elevation of L shoulder      Treatment Performed:    Therapeutic Exercise:    45 min  UBE L3 3'F/2'R  TRX inverted row 3x10  TRX 90/90 ER 3x10  TRX chest press (elbows in) 3 x 10  W shoulder ER with scapular retraction blue tband 2x10  Standing shoulder horizontal abduction with blue tband 2x10  Low trap setting at wall with blue tband 3x10  4 kg KB bottoms up carry 30\" x 2 R/L  Shoulder Scaption 3# 3x10  Shoulder flexion 3# 3x10  Serratus activation with shoulder flexion, blue tband loop 2x10  Prone on Tball T's 0# 2x10  Prone on Tball Y's 0# 2x10    Not performed:  Plank on forearms with scapular protraction AROM 3 x 30\"  Side plank 2x30\" R/L        Personalized Home Exercise Program:  Access Code: [email or web address]  URL: [email or web address]  Date: 04/14/2025  Prepared by: Shima Skelton     Exercises  - Standing Isometric Shoulder Abduction with Doorway - Arm Bent  - 1-2 x daily - 7 x weekly - 5 reps - 15 seconds hold  - Isometric Shoulder External Rotation with Ball at Wall  - 1-2 x daily - 7 " x weekly - 5 reps - 15 seconds hold  - Prone Scapular Retraction  - 1 x daily - 7 x weekly - 2 sets - 10 reps - 5 seconds hold  - Prone Scapular Retraction Y  - 1 x daily - 7 x weekly - 3 sets - 10 reps  - Plank on Forearms with Scapular Protraction Retraction AROM  - 1 x daily - 7 x weekly - 3 reps - 30 seconds hold  - Shoulder W - External Rotation with Resistance  - 1 x daily - 7 x weekly - 3 sets - 10 reps     Charges: TE x3    Assessment:   Did well with progression of shoulder strengthening exercises.  Moderate fatigue but without pain production.     Plan:  Continue to increase RTC and scapular muscle strength and dynamic stability to reduce pain and improve function.  Progress HEP.  Roughly 4 more visits.       Shima Skelton, PT

## 2025-06-06 ENCOUNTER — TREATMENT (OUTPATIENT)
Dept: PHYSICAL THERAPY | Facility: CLINIC | Age: 40
End: 2025-06-06
Payer: COMMERCIAL

## 2025-06-06 DIAGNOSIS — M25.512 LEFT SHOULDER PAIN, UNSPECIFIED CHRONICITY: ICD-10-CM

## 2025-06-06 PROCEDURE — 97110 THERAPEUTIC EXERCISES: CPT | Mod: GP,CQ | Performed by: SPECIALIST/TECHNOLOGIST

## 2025-06-06 ASSESSMENT — PAIN SCALES - GENERAL: PAINLEVEL_OUTOF10: 0 - NO PAIN

## 2025-06-06 ASSESSMENT — PAIN - FUNCTIONAL ASSESSMENT: PAIN_FUNCTIONAL_ASSESSMENT: 0-10

## 2025-06-06 NOTE — PROGRESS NOTES
Physical Therapy  Physical Therapy Treatment Note    Patient Name: Lashaun Cruz  MRN: [id number]  Today's Date: 6/6/2025  Time Calculation  Start Time: 0655  Stop Time: 0740  Time Calculation (min): 45 min    Insurance:  Visit number: 5 of 20  Authorization info: No Auth  Insurance Type: MMO  Onset date: 3/23/2025     General:  Reason for visit: L Rotator Cuff Strain  Referred by: Cesar Rosado MD    Current Problem  1. Left shoulder pain, unspecified chronicity  Follow Up In Physical Therapy            Precautions: STEADI Fall Risk Score (The score of 4 or more indicates an increased risk of falling): 1       Subjective:     Patient reports some improvement in mobility and pain frequency in the shoulder.  Still notes that the shoulder can be painful when carrying her child.  It also fatigues quicker than the right.     Pain  Pain Assessment: 0-10  0-10 (Numeric) Pain Score: 0 - No pain    Performing HEP?: Yes      Objective:   Demonstrated good AROM elevation of L shoulder      Treatment Performed:    Therapeutic Exercise:    45 min  UBE L3 3'F/2'R  Bravo Row 7.5# 20x, stand ext 5# 20x  Blue TB No money 2x10  Blue TB Horiz abd 2x10   4 kg KB bottoms up carry 20 ft x 4 R/L  Shoulder Scaption 3# 3x10  Shoulder flexion 3# 3x10  Shoulder abd 3# 3x10   Prone on Tball T's 0# 3x10  Prone on Tball Y's 0# 3x10  Wall slide stability 4 pos 10x R/L   Wall push ups 15x  Doorframe pec stretch 1'   2# sidelying ER 3x10  2# sidelying abd 3x10        Personalized Home Exercise Program:  Access Code: [email or web address]  URL: [email or web address]  Date: 06/06/2025  Prepared by: Roger Sprague    Exercises  - Standing Isometric Shoulder Abduction with Doorway - Arm Bent  - 1-2 x daily - 7 x weekly - 5 reps - 15 seconds hold  - Isometric Shoulder External Rotation with Ball at Wall  - 1-2 x daily - 7 x weekly - 5 reps - 15 seconds hold  - Prone Scapular Retraction  - 1 x daily - 7 x weekly - 2 sets - 10 reps - 5 seconds  hold  - Prone Scapular Retraction Y  - 1 x daily - 7 x weekly - 3 sets - 10 reps  - Plank on Forearms with Scapular Protraction Retraction AROM  - 1 x daily - 7 x weekly - 3 reps - 30 seconds hold  - Shoulder W - External Rotation with Resistance  - 1 x daily - 7 x weekly - 3 sets - 10 reps  - Prone Lower Trapezius Strengthening on Swiss Ball  - 1 x daily - 7 x weekly - 3 sets - 10 reps  - Prone Middle Trapezius Strengthening on Swiss Ball  - 1 x daily - 7 x weekly - 3 sets - 10 reps  - Standing Shoulder Flexion to 90 Degrees with Dumbbells  - 1 x daily - 7 x weekly - 3 sets - 10 reps  - Scaption with Dumbbells  - 1 x daily - 7 x weekly - 3 sets - 10 reps  - Standing Shoulder Abduction with Dumbbells  - 1 x daily - 7 x weekly - 3 sets - 10 reps  - Sidelying Shoulder ER with Towel and Dumbbell  - 1 x daily - 7 x weekly - 3 sets - 10 reps  - Sidelying Shoulder Abduction  - 1 x daily - 7 x weekly - 3 sets - 10 reps     Charges: TE x3    Assessment:   Patient tolerated intensity with mild fatigue noting feeling good effort.  Patient noted no increase in symptoms post treatment.      Plan:  Continue to increase RTC and scapular muscle strength and dynamic stability to reduce pain and improve function.  Progress HEP.  Patient has 2 further strengthening sessions prior to re-check.        Roger Sprague, PTA

## 2025-06-09 DIAGNOSIS — F90.0 ADHD, PREDOMINANTLY INATTENTIVE TYPE: ICD-10-CM

## 2025-06-09 RX ORDER — DEXTROAMPHETAMINE SULFATE, DEXTROAMPHETAMINE SACCHARATE, AMPHETAMINE SULFATE AND AMPHETAMINE ASPARTATE 5; 5; 5; 5 MG/1; MG/1; MG/1; MG/1
20 CAPSULE, EXTENDED RELEASE ORAL EVERY MORNING
Qty: 30 CAPSULE | Refills: 0 | Status: SHIPPED | OUTPATIENT
Start: 2025-06-09

## 2025-06-11 ENCOUNTER — TREATMENT (OUTPATIENT)
Dept: PHYSICAL THERAPY | Facility: CLINIC | Age: 40
End: 2025-06-11
Payer: COMMERCIAL

## 2025-06-11 DIAGNOSIS — M25.512 LEFT SHOULDER PAIN, UNSPECIFIED CHRONICITY: ICD-10-CM

## 2025-06-11 PROCEDURE — 97110 THERAPEUTIC EXERCISES: CPT | Mod: GP,CQ | Performed by: SPECIALIST/TECHNOLOGIST

## 2025-06-11 ASSESSMENT — PAIN SCALES - GENERAL: PAINLEVEL_OUTOF10: 0 - NO PAIN

## 2025-06-11 ASSESSMENT — PAIN - FUNCTIONAL ASSESSMENT: PAIN_FUNCTIONAL_ASSESSMENT: 0-10

## 2025-06-11 NOTE — PROGRESS NOTES
Physical Therapy  Physical Therapy Treatment Note    Patient Name: Lashaun Cruz  MRN: [id number]  Today's Date: 6/11/2025  Time Calculation  Start Time: 1507  Stop Time: 1548  Time Calculation (min): 41 min    Insurance:  Visit number: 6 of 20  Authorization info: No Auth  Insurance Type: MMO  Onset date: 3/23/2025     General:  Reason for visit: L Rotator Cuff Strain  Referred by: Cesar Rosado MD    Current Problem  1. Left shoulder pain, unspecified chronicity  Follow Up In Physical Therapy            Precautions: STEADI Fall Risk Score (The score of 4 or more indicates an increased risk of falling): 1       Subjective:     Patient arrived 8 minutes late due to phone getting wet and needing maintenance.  Patient notes no pain on arrival only positional pain when reaching.       Pain  Pain Assessment: 0-10  0-10 (Numeric) Pain Score: 0 - No pain    Performing HEP?: Yes      Objective:   Demonstrated good AROM elevation of L shoulder      Treatment Performed:    Therapeutic Exercise:    45 min  UBE L3 3'F/2'R  Bravo Row 7.5# 20x, stand ext 5# 20x  Blue TB No money 2x10  Blue TB Horiz abd 2x10   4 kg KB bottoms up carry 20 ft x 4 R/L  Shoulder Scaption 3# 3x10  Shoulder flexion 3# 3x10  Shoulder abd 3# 3x10   Prone on Tball T's 0# 3x10  Prone on Tball Y's 0# 3x10  Wall slide stability 4 pos 10x R/L   Wall push ups 15x  Doorframe pec stretch 1'           Personalized Home Exercise Program:  Access Code: [email or web address]  URL: [email or web address]  Date: 06/06/2025  Prepared by: Roger Sprague    Exercises  - Standing Isometric Shoulder Abduction with Doorway - Arm Bent  - 1-2 x daily - 7 x weekly - 5 reps - 15 seconds hold  - Isometric Shoulder External Rotation with Ball at Wall  - 1-2 x daily - 7 x weekly - 5 reps - 15 seconds hold  - Prone Scapular Retraction  - 1 x daily - 7 x weekly - 2 sets - 10 reps - 5 seconds hold  - Prone Scapular Retraction Y  - 1 x daily - 7 x weekly - 3 sets - 10  reps  - Plank on Forearms with Scapular Protraction Retraction AROM  - 1 x daily - 7 x weekly - 3 reps - 30 seconds hold  - Shoulder W - External Rotation with Resistance  - 1 x daily - 7 x weekly - 3 sets - 10 reps  - Prone Lower Trapezius Strengthening on Swiss Ball  - 1 x daily - 7 x weekly - 3 sets - 10 reps  - Prone Middle Trapezius Strengthening on Swiss Ball  - 1 x daily - 7 x weekly - 3 sets - 10 reps  - Standing Shoulder Flexion to 90 Degrees with Dumbbells  - 1 x daily - 7 x weekly - 3 sets - 10 reps  - Scaption with Dumbbells  - 1 x daily - 7 x weekly - 3 sets - 10 reps  - Standing Shoulder Abduction with Dumbbells  - 1 x daily - 7 x weekly - 3 sets - 10 reps  - Sidelying Shoulder ER with Towel and Dumbbell  - 1 x daily - 7 x weekly - 3 sets - 10 reps  - Sidelying Shoulder Abduction  - 1 x daily - 7 x weekly - 3 sets - 10 reps     Charges: TE x3    Assessment:   Patient tolerated similar intensity with mild fatigue noting feeling good effort.  Patient noted no increase in symptoms post treatment.      Plan:  Continue to increase RTC and scapular muscle strength and dynamic stability to reduce pain and improve function.  Progress HEP.  Patient has 1 further strengthening sessions prior to re-check with Gilda Skelton PT, AT on July 7th.        Roger Sprague PTA

## 2025-06-23 ENCOUNTER — TREATMENT (OUTPATIENT)
Dept: PHYSICAL THERAPY | Facility: CLINIC | Age: 40
End: 2025-06-23
Payer: COMMERCIAL

## 2025-06-23 DIAGNOSIS — M25.512 LEFT SHOULDER PAIN, UNSPECIFIED CHRONICITY: ICD-10-CM

## 2025-06-23 PROCEDURE — 97110 THERAPEUTIC EXERCISES: CPT | Mod: GP,CQ | Performed by: SPECIALIST/TECHNOLOGIST

## 2025-06-23 ASSESSMENT — PAIN - FUNCTIONAL ASSESSMENT: PAIN_FUNCTIONAL_ASSESSMENT: 0-10

## 2025-06-23 ASSESSMENT — PAIN SCALES - GENERAL: PAINLEVEL_OUTOF10: 0 - NO PAIN

## 2025-06-23 NOTE — PROGRESS NOTES
Physical Therapy  Physical Therapy Treatment Note    Patient Name: Lashaun Cruz  MRN: [id number]  Today's Date: 6/23/2025  Time Calculation  Start Time: 1417  Stop Time: 1459  Time Calculation (min): 42 min    Insurance:  Visit number: 7 of 20  Authorization info: No Auth  Insurance Type: MMO  Onset date: 3/23/2025     General:  Reason for visit: L Rotator Cuff Strain  Referred by: Cesar Rosado MD    Current Problem  1. Left shoulder pain, unspecified chronicity  Follow Up In Physical Therapy            Precautions: STEADI Fall Risk Score (The score of 4 or more indicates an increased risk of falling): 1       Subjective:     Patient notes no pain on arrival with intermittent pain with activity.      Pain  Pain Assessment: 0-10  0-10 (Numeric) Pain Score: 0 - No pain    Performing HEP?: Yes      Objective:   Demonstrated good AROM elevation of L shoulder      Treatment Performed:    Therapeutic Exercise:    45 min  UBE L3 3'F/2'R  Bravo Row 7.5# 20x, stand ext 5# 20x  Blue TB No money 2x10  Blue TB Horiz abd 2x10   4 kg KB bottoms up carry 20 ft x 4 R/L  Shoulder Scaption 3# 3x10  Shoulder flexion 3# 3x10  Shoulder abd 3# 3x10   Prone on Tball T's 0# 2x10  Prone on Tball Y's 0# 2x10  Prone on Tball I 0# 2x20   Wall slide stability 4 pos 10x R/L   Wall push ups 15x  Doorframe (2 pos) pec stretch 1'   Swisswing hands 2'           Personalized Home Exercise Program:  Access Code: [email or web address]  URL: [email or web address]  Date: 06/06/2025  Prepared by: Roger Sprague    Exercises  - Standing Isometric Shoulder Abduction with Doorway - Arm Bent  - 1-2 x daily - 7 x weekly - 5 reps - 15 seconds hold  - Isometric Shoulder External Rotation with Ball at Wall  - 1-2 x daily - 7 x weekly - 5 reps - 15 seconds hold  - Prone Scapular Retraction  - 1 x daily - 7 x weekly - 2 sets - 10 reps - 5 seconds hold  - Prone Scapular Retraction Y  - 1 x daily - 7 x weekly - 3 sets - 10 reps  - Plank on Forearms with  Scapular Protraction Retraction AROM  - 1 x daily - 7 x weekly - 3 reps - 30 seconds hold  - Shoulder W - External Rotation with Resistance  - 1 x daily - 7 x weekly - 3 sets - 10 reps  - Prone Lower Trapezius Strengthening on Swiss Ball  - 1 x daily - 7 x weekly - 3 sets - 10 reps  - Prone Middle Trapezius Strengthening on Swiss Ball  - 1 x daily - 7 x weekly - 3 sets - 10 reps  - Standing Shoulder Flexion to 90 Degrees with Dumbbells  - 1 x daily - 7 x weekly - 3 sets - 10 reps  - Scaption with Dumbbells  - 1 x daily - 7 x weekly - 3 sets - 10 reps  - Standing Shoulder Abduction with Dumbbells  - 1 x daily - 7 x weekly - 3 sets - 10 reps  - Sidelying Shoulder ER with Towel and Dumbbell  - 1 x daily - 7 x weekly - 3 sets - 10 reps  - Sidelying Shoulder Abduction  - 1 x daily - 7 x weekly - 3 sets - 10 reps     Charges: TE x3    Assessment:   Patient tolerated intensity without fatigue noting feeling ok post treatment. Reprinted HEP home page due to needing code for new phone.       Plan:  Continue to increase RTC and scapular muscle strength and dynamic stability to reduce pain and improve function. Patient has re-check with Gilda Skelton PT, AT on July 7th.        Roger Sprague PTA

## 2025-06-30 ENCOUNTER — APPOINTMENT (OUTPATIENT)
Dept: PRIMARY CARE | Facility: CLINIC | Age: 40
End: 2025-06-30
Payer: COMMERCIAL

## 2025-07-03 DIAGNOSIS — F90.0 ADHD, PREDOMINANTLY INATTENTIVE TYPE: ICD-10-CM

## 2025-07-03 RX ORDER — DEXTROAMPHETAMINE SULFATE, DEXTROAMPHETAMINE SACCHARATE, AMPHETAMINE SULFATE AND AMPHETAMINE ASPARTATE 5; 5; 5; 5 MG/1; MG/1; MG/1; MG/1
20 CAPSULE, EXTENDED RELEASE ORAL EVERY MORNING
Qty: 30 CAPSULE | Refills: 0 | Status: SHIPPED | OUTPATIENT
Start: 2025-07-03

## 2025-07-07 ENCOUNTER — TREATMENT (OUTPATIENT)
Dept: PHYSICAL THERAPY | Facility: CLINIC | Age: 40
End: 2025-07-07
Payer: COMMERCIAL

## 2025-07-07 DIAGNOSIS — M25.512 LEFT SHOULDER PAIN, UNSPECIFIED CHRONICITY: ICD-10-CM

## 2025-07-07 PROCEDURE — 97110 THERAPEUTIC EXERCISES: CPT | Mod: GP

## 2025-07-07 NOTE — PROGRESS NOTES
Physical Therapy  Physical Therapy Orthopedic Progress and Discharge Note    Patient Name: Lashaun Cruz  MRN: [id number]  Today's Date: 7/7/2025  Time Calculation  Start Time: 1536  Stop Time: 1620  Time Calculation (min): 44 min    Insurance:  Visit number: 8 of 20  Authorization info: No Auth  Insurance Type: MMO  Onset date: 3/23/2025     General:  Reason for visit: L Rotator Cuff Strain  Referred by: Cesar Rosado MD    Current Problem  1. Left shoulder pain, unspecified chronicity  Follow Up In Physical Therapy          Precautions: CLIVEADI Fall Risk Score (The score of 4 or more indicates an increased risk of falling): 1       Subjective:     Patient reports symptoms are improved about 85% since the start of PT.     Current Condition:   better     Pain  Current:  0/10  At worst: 5/10, not often    Location: Left lateral shoulder  Description: pinch    Exacerbating Factors: Reaching awkwardly    Functional Limitations: Slightly more difficult on the left side with overhead lifting      Performing HEP?: Yes      Objective:   Posture: WNL                             ROM     Cervical AROM (Degrees)     Flexion: 68  Extension: 84  (L) Side Bend: 56  (R) Side Bend: 53, pull in L anterior neck triangle  (L) Rotation: 77  (R) Rotation: 74        Shoulder AROM (Degrees)                             (R)                    (L)  Flexion:            173                   174         Abduction:       170                   174                                     Functional ER:  90                     87               Functional IR:   T4                     T3                                                                               Elbow AROM (Degrees)                             (R)                    (L)  Flexion:            WNL                 WNL                                           Extension:        0                      0                                                                                          Strength Testing     Shoulder                          (R)                    (L)  Flexion:            5                      5                                                Abduction:       5                      5                               ER:                    5                      5                                    IR:                     5                      5                                          Elbow                          (R)                    (L)  Flexion:            5                      5                                                            Extension:        5                      5                                          Periscapular Musculature                          (R)                    (L)  Upper Traps:    5                      5                                    Middle Traps:   4+                    4                                   Lower Traps:    4                       4                                   Rhomboids:     5                      5                                        Joint Mobility: hypermobility bilateral GH                                Special Tests     RTC/Impingement              Neer Impingement: L (-)              Garcia Cody: L (-)              Empty Can: L (-)              Drop Arm: L (-)              Painful Arc: L (-)              Lift Off Test: L (-)  AC Joint              Cross Arm Test: L (-)  Instability              Sulcus Test: L (+), R (+)                Radicular Symptoms: (-)      Outcome Measures: Updated 7/7/2025  Other Measures  Disability of Arm Shoulder Hand (DASH): 11%      Goals: Updated 7/7/2025  Resolved       PT Problem       PT Goal 1 (Met)       Start:  04/14/25    Expected End:  04/28/25    Resolved:  07/07/25    Demonstrate HEP adherence to promote pain reduction and functional gains.         PT Goal 2 (Adequate for Discharge)       Start:  04/14/25    Expected End:  06/09/25       Left shoulder ROM WNL and  without pain >1-2/10. Met  Left shoulder strength measures of 5/5. Nearly Met  Don/doff clothing without pain >1/10 in left shoulder. Met  Do household duties without difficulty and without pain > 1/10 in left shoulder. Met  Sleep without interruption due to left shoulder pain. Met  Lift 15lbs overhead without pain >1/10 Met  Patient will rate pain < 2/10 at worst to improve activity tolerance. Progressing                   Treatment Performed:      Therapeutic Exercise:    44 min  UBE L3 3'F/2'R  Performed recheck and discussed  Reviewed and updated HEP        Personalized Home Exercise Program:  Access Code: [email or web address]  URL: [email or web address]  Date: 07/07/2025  Prepared by: Shima Skelton    Exercises  - Plank on Forearms with Scapular Protraction Retraction AROM  - 3-4 x weekly - 3 reps - 30 seconds hold  - Prone Scapular Retraction Y  - 3-4 x weekly - 3 sets - 10 reps  - Prone Lower Trapezius Strengthening on Swiss Ball  - 3-4 x weekly - 3 sets - 10 reps  - Prone Middle Trapezius Strengthening on Swiss Ball  - 3-4 x weekly - 3 sets - 10 reps  - Sidelying Shoulder ER with Towel and Dumbbell  - 3-4 x weekly - 3 sets - 10 reps  - Shoulder W - External Rotation with Resistance  - 3-4 x weekly - 3 sets - 10 reps  - Standing Shoulder Flexion to 90 Degrees with Dumbbells  - 3-4 x weekly - 3 sets - 10 reps  - Scaption with Dumbbells  - 3-4 x weekly - 3 sets - 10 reps  - Standing Shoulder Abduction with Dumbbells  - 3-4 x weekly - 3 sets - 10 reps  - Shoulder Overhead Press in Flexion with Dumbbells  - 3-4 x weekly - 3 sets - 10 reps    EDUCATION: home exercise program, plan of care, activity modifications,      Charges: TE x 3    Assessment:  Lashaun has made very good progress in PT.  She has full shoulder ROM and 5/5 RTC muscle strength.  She still has mild scapular muscle weakness which she continues to address with her HEP.  Her symptoms are notably improved since the start of PT and she  is functioning well.  She is ready for discharge from PT at this time.       Plan of Care: Updated 7/7/2025     Discharge to HEP.    Shima Skelton, PT

## 2025-07-09 ENCOUNTER — APPOINTMENT (OUTPATIENT)
Dept: OTOLARYNGOLOGY | Facility: CLINIC | Age: 40
End: 2025-07-09
Payer: COMMERCIAL

## 2025-07-09 VITALS — HEIGHT: 68 IN | WEIGHT: 175 LBS | BODY MASS INDEX: 26.52 KG/M2

## 2025-07-09 DIAGNOSIS — C73 PAPILLARY CARCINOMA OF THYROID: ICD-10-CM

## 2025-07-09 DIAGNOSIS — J38.3 DISORDER OF VOCAL CORD: ICD-10-CM

## 2025-07-09 DIAGNOSIS — J32.8 OTHER CHRONIC SINUSITIS: Primary | ICD-10-CM

## 2025-07-09 PROCEDURE — 3008F BODY MASS INDEX DOCD: CPT | Performed by: OTOLARYNGOLOGY

## 2025-07-09 PROCEDURE — 99214 OFFICE O/P EST MOD 30 MIN: CPT | Performed by: OTOLARYNGOLOGY

## 2025-07-09 PROCEDURE — 31231 NASAL ENDOSCOPY DX: CPT | Performed by: OTOLARYNGOLOGY

## 2025-07-09 ASSESSMENT — PATIENT HEALTH QUESTIONNAIRE - PHQ9
SUM OF ALL RESPONSES TO PHQ9 QUESTIONS 1 AND 2: 0
2. FEELING DOWN, DEPRESSED OR HOPELESS: NOT AT ALL
1. LITTLE INTEREST OR PLEASURE IN DOING THINGS: NOT AT ALL

## 2025-07-09 NOTE — PROGRESS NOTES
Referring Provider: No ref. provider found    History of Present Illness:    Lashaun Cruz is a 40 y.o. female, presenting for evaluation of chronic sinusitis with chief symptom being left maxillary pressure.  She has been battling sinusitis off and on for the past 15 to 20 years and had been following with Dr. Heath.  She follows with him s/p hemithyroidectomy in 2022 with postoperative hoarseness Dr. Mixon and is s/p 1 round of in office vocal cord injection and does not notice any major issues at this time with her voice.  Although notes are limited from 2009, the patient describes receiving endoscopic sinus surgery to the bilateral maxillary sinuses and ethmoids.  She notes he did not have to do a septoplasty.  She notes that he did not have to operate on her frontal sinuses or sphenoids.  After that procedure, she noted significant improvement, but now with recurrence of symptoms especially over the past 2 years.    She denies any significant nasal drainage or discharge, epistaxis, vision changes.  She notes that her smell can be variable and sometimes delayed.  Over the past year, she has had at least 2 episodes of acute sinusitis where she was prescribed a Z-Pepito for these, referencing her antibiotic choices were limited due to breast-feeding.  She currently has a 10-month-old and a 2-year-old at home.    Her current regimen is Flonase daily, azelastine in the evenings or as needed, and saline only rinses daily.  She does not use any medicated rinses.    ?  Review of Systems:    Review of symptoms was negative except for those stated including Cardiopulmonary, Genitourinary, Gastrointestinal, Psychological, Sleep pattern, Endocrine, Eyes, Neurologic, Musculoskeletal, Skin, Hematologic/Lymphatic and Allergic/Immunologic.     Medical History:    I have reviewed the patient's updated past medical history, surgical history, family history, social history, as well as current medications and allergies as of  "7/9/2025. Changes to these items have been updated and marked as reviewed in the electronic medical record.    Physical Exam:    Vitals:  height is 1.727 m (5' 8\") and weight is 79.4 kg (175 lb).   General: Patient doing well overall and is in no apparent distress.  Psych: Pleasant affect, and answers questions appropriately.  Head & Face: Symmetric facial movements  Eyes: Pupils equal, round, reactive.  Extraocular movements intact without gaze restrictions or nystagmus. No epiphora.  Ears:  External auditory canals are normal.  Tympanic membranes are clear.  No middle ear effusion is seen.  All middle ear landmarks are normal.  Nose: Anterior rhinoscopy revealed normal sinonasal mucosa. More posterior areas of the nasal cavity could not be completely examined.  Oral Cavity/Oropharynx:  Without lesions or masses to visual exam.  Neck: Supple without lymphadenopathy.  Lungs: Non-labored, and without evidence of stridor.  Cardiac: Pulses are strong, well-perfused.  Extremities: Without gross evidence of clubbing, cyanosis, or edema.  Neuro: Cranial nerves II-XII grossly intact; Intact facial movements.    Procedure:  Rigid nasal endoscopy (66902)  Pre-procedure diagnosis/Indication for procedure:  To evaluate areas not visualized on anterior rhinoscopy   Anesthesia:  None  Description:  A 0-degree 3-mm rigid nasal endoscope was used to examine the left and right nasal cavities.  The nasal valve areas were examined for abnormalities or collapse.  The inferior and middle turbinates were evaluated.  The middle and superior meatuses, and the sphenoethmoid recesses were examined and inspected for mucopurulence and polyps. Once the endoscope was withdrawn, the patient was noted to have tolerated the procedure well without complications and was returned to ambulatory status.    Findings:  Evidence of bilateral maxillary antrostomies and ethmoidectomies.  No polyps or purulence encountered.  Bilateral maxillary sinuses are " patent without evidence of disease    Assessment:   Lashaun Cruz is a 40 y.o. female with history of papillary thyroid carcinoma s/p hemithyroidectomy presenting as referral with a history of chronic sinusitis s/p FESS involving bilateral maxillary and ethmoids now with recurrence of symptoms, chief concern being left maxillary pressure.  Scope today shows no signs of active sinusitis and is reassuring.     Plan:    - Continue daily nasal irrigations, add nasal steroid to the rinses  - Continue azelastine  -Reach out to the office if develop another flare of sinusitis  - Deferring imaging or procedural intervention in favor of conservative rinses and management      Yamilet Iraheta MD, M.Eng.   of Otolaryngology - Head & Neck Surgery  Division of Rhinology and Endoscopic Skull Base Surgery  [facility]

## 2025-07-14 RX ORDER — MOMETASONE FUROATE 100 %
POWDER (GRAM) MISCELLANEOUS
Qty: 180 G | Refills: 3 | Status: SHIPPED | OUTPATIENT
Start: 2025-07-14

## 2025-07-15 DIAGNOSIS — B37.9 YEAST INFECTION: Primary | ICD-10-CM

## 2025-07-15 RX ORDER — FLUCONAZOLE 150 MG/1
150 TABLET ORAL ONCE
Qty: 1 TABLET | Refills: 0 | Status: SHIPPED | OUTPATIENT
Start: 2025-07-15 | End: 2025-07-15

## 2025-07-31 ENCOUNTER — APPOINTMENT (OUTPATIENT)
Dept: OBSTETRICS AND GYNECOLOGY | Facility: CLINIC | Age: 40
End: 2025-07-31
Payer: COMMERCIAL

## 2025-08-07 NOTE — PROGRESS NOTES
Urogynecology  Provider:  Bridget Maloney MD  [phone]              ASSESSMENT AND PLAN:     Problem List Items Addressed This Visit    None          I spent a total of {eConsult Time:50364} in face to face and non face to face time.        Bridget Maloney MD        HISTORY OF PRESENT ILLNESS:     Last visit 3/2025  39-year-old female being assessed for dyspareunia, high-tone pelvic floor dysfunction, and DEEPIKA.     Diagnoses:  #1 Dyspareunia  #2 High-tone pelvic floor dysfunction  #3 Stress incontinence  #4 Mild cystocele with posterior defect     Plan:  Dyspareunia, high-tone PFD  - Exam reveals high-tone levator ani muscles, which are likely contributing to her dyspareunia, especially from behind. Not particularly tender, but she admits to a high pain tolerance due to prior trauma to the spinal cord.   - Recommended PFPT focusing on muscle relaxation rather than strengthening. She is amenable.  - She is getting PT on her shoulder, so she would prefer not to use the same therapist group.  - Cease Kegel exercises and use of the Bloom device.  - PFPT requisition sent today and gave her the list of local physical therapists with their corresponding locations/contact information.     2. DEEPIKA  - Likely related to postpartum changes and mild anterior vaginal wall prolapse observed on examination.  - PFPT is expected to improve DEEPIKA sxs.     3. Cystocele  - Mild anterior wall vaginal prolapse observed on examination + posterior wall defect, both coming to -1.   - Discussed the mild nature of the prolapse at this time and that surgical intervention is not currently indicated.     Follow-up in 4 months to reassess sxs and response to PFPT. He already goes to the [facility] for shoulder PT so would prefer to go elsewhere. Lives in [facility].  All questions were answered, and she is amenable to the proposed plan. No contraception is required as her boyfriend has had a vasectomy.    Record Review:      Prolapse Symptoms :     Urinary Symptoms:     Bowel Symptoms:     Sexual Activity:          Past Medical History:      Medical History[1]       Past Surgical History:       Surgical History[2]      Medications:       Prior to Admission medications   Medication Sig Start Date End Date Taking? Authorizing Provider   Adderall XR 20 mg 24 hr capsule Take 1 capsule (20 mg) by mouth once daily in the morning. 7/3/25   Jackie Powers MD   albuterol 90 mcg/actuation inhaler Inhale 2 puffs. EVERY 4-6 HOURS AS NEEDED 11/10/22   Historical Provider, MD   azelastine (Astelin) 137 mcg (0.1 %) nasal spray Administer 1 spray into each nostril 2 times a day. Use in each nostril as directed 10/31/24   Jackie Powers MD   clobetasol (Temovate) 0.05 % cream Apply topically 2 times a day. 5/1/25 12/27/25  Jackie Powers MD   diphenhydrAMINE (Benadryl Allergy) 25 mg tablet Take 1 tablet (25 mg) by mouth. 10/25/22   Historical Provider, MD   fexofenadine (Allegra) 180 mg tablet Take 1 tablet (180 mg) by mouth once daily.    Historical Provider, MD   fluticasone (Flonase Sensimist) 27.5 mcg/actuation nasal spray Administer 2 sprays into each nostril once daily.    Historical Provider, MD   fluticasone propion-salmeteroL (Advair Diskus) 250-50 mcg/dose diskus inhaler Inhale 1 puff 2 times a day. 4/25/24   Jackie Powers MD   ibuprofen 600 mg tablet Take 1 tablet (600 mg) by mouth every 6 hours if needed for mild pain (1 - 3). 9/3/24   Shonda Gramajo, APRN-CNM   mometasone furoate, bulk, 100 % powder Compound 1 mg capsule to be added to sinus rinse twice daily. 7/14/25   Yamilet MORALES MD   prenatal vitamin, iron-folic, (Prenatal Vitamin) 27 mg iron-800 mcg folic acid tablet Take by mouth. 2/14/23   Historical Provider, MD   SUMAtriptan (Imitrex) 100 mg tablet Take 1 tablet (100 mg) by mouth 1 time if needed for migraine. 5/1/25   Jackie Powers MD   tretinoin (Retin-A) 0.05 % cream Apply topically once daily at  bedtime. 21   Historical Provider, MD [place]  Review of Systems     POC Blood, Urine   Date Value Ref Range Status   2025 TRACE-Intact (A) NEGATIVE Final     Poc Nitrite, Urine   Date Value Ref Range Status   2025 NEGATIVE NEGATIVE Final     POC Urobilinogen, Urine   Date Value Ref Range Status   2025 0.2 0.2, 1.0 EU/DL Final     POC Leukocytes, Urine   Date Value Ref Range Status   2025 NEGATIVE NEGATIVE Final         PHYSICAL EXAM:      There were no vitals taken for this visit.     No LMP recorded.      Declines chaperone for physical exam.      Well developed, well nourished, in no apparent distress.   Neurologic/Psychiatric:  Awake, Alert and Oriented times 3.  Affect normal.     GENITAL/URINARY:       External Genitalia:  The patient has normal appearing external genitalia, normal skenes and bartholins glands, and a normal hair distribution.  Her vulva is without lesions, erythema or discharge.  It is non-tender with appropriate sensation.     Urethral Meatus:  Size normal, Location normal, Lesions absent, Prolapse absent,      Urethra:  Fullness absent, Masses absent,      Bladder:  Fullness absent, Masses absent, Tenderness absent,      Vagina:  General appearance normal, Estrogen effect normal, Discharge absent, Lesions absent, ***     Cervix: Normal, no discharge.   Uterus:  {UTERUS, EXAM:70289}  Adnexa: {Adnexa:01440}     Anus/Perineum:  Lesions absent and Masses absent {Exam; anus:41514}  {Exam; anus and perineum:75037}    Stress urinary incontinence *** demonstrable.         POP-Q:  Stage: {NUMBERS 0-4:29482}  Position: {SITTING STANDIN}    Aa: ***       Ba: *** C: ***   Gh: *** Pb: *** TVL: ***         Ap: *** Bp: *** D: ***               Data and DIAGNOSTIC STUDIES REVIEWED   Imaging  No results found.    Cardiology, Vascular, and Other Imaging  No other imaging results found for the past 7 days     Lab Results   Component Value Date    URINECULTURE No  significant growth 05/02/2024      Lab Results   Component Value Date    GLUCOSE 77 04/30/2025    CALCIUM 9.5 04/30/2025     04/30/2025    K 4.6 04/30/2025    CO2 29 04/30/2025     04/30/2025    BUN 7 04/30/2025    CREATININE 0.67 04/30/2025     Lab Results   Component Value Date    WBC 7.2 04/30/2025    HGB 14.5 04/30/2025    HCT 43.8 04/30/2025    MCV 91.8 04/30/2025     04/30/2025          Bridget Maloney MD             [1]   Past Medical History:  Diagnosis Date    Acute candidiasis of vulva and vagina 01/19/2017    Vaginal candidiasis    Acute maxillary sinusitis, unspecified 11/05/2022    Sinusitis, acute, maxillary    Acute vaginitis 09/28/2020    Bacterial vaginitis    Cervical disc disorder, unspecified, unspecified cervical region 06/30/2020    Disorder of intervertebral disc of cervical spine    Cutaneous abscess of unspecified foot 04/25/2014    Toe abscess    Dermatitis, unspecified 04/13/2017    Eczema of left hand    Displaced fracture of shaft of left clavicle, initial encounter for closed fracture 10/11/2021    Closed displaced fracture of shaft of left clavicle, initial encounter    Dyshidrosis (pompholyx) 04/08/2019    Dyshidrosis    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    Hypoxia 05/16/2023    Iliotibial band syndrome, right leg 09/11/2019    Iliotibial band syndrome of right side    Local infection of the skin and subcutaneous tissue, unspecified 04/25/2014    Toe infection    Localized swelling, mass and lump, head 10/14/2021    Localized swelling, mass and lump, head    Otalgia, right ear 02/01/2017    Chronic right ear pain    Other chest pain 01/15/2018    Chest pain, localized    Other intervertebral disc displacement, lumbar region 10/20/2021    Lumbar disc herniation    Other specified symptoms and signs involving the circulatory and respiratory systems 10/11/2016    Globus sensation    Other sprain of right hip, initial encounter 05/05/2020    Tear of right  acetabular labrum, initial encounter    Other symptoms and signs involving the musculoskeletal system 09/27/2021    Weakness of right hip    Pain in left finger(s) 03/20/2018    Thumb pain, left    Pain in right hip 01/26/2021    Hip pain, right    Pain in unspecified knee     Joint pain, knee    Pelvic and perineal pain 03/23/2020    Pain, pelvic, female    Personal history of diseases of the skin and subcutaneous tissue 11/14/2019    History of pustular acne    Personal history of other benign neoplasm 01/26/2021    History of other benign neoplasm    Personal history of other diseases of the digestive system     History of hemorrhoids    Personal history of other diseases of the female genital tract 06/30/2020    History of dysfunctional uterine bleeding    Personal history of other diseases of the musculoskeletal system and connective tissue 08/14/2013    History of low back pain    Personal history of other diseases of the respiratory system 04/13/2017    History of acute bronchitis    Personal history of other endocrine, nutritional and metabolic disease 03/23/2020    History of thyroid nodule    Personal history of other endocrine, nutritional and metabolic disease 03/09/2020    History of hyperthyroidism    Personal history of other infectious and parasitic diseases 04/08/2019    History of traveler's diarrhea    Rash and other nonspecific skin eruption 02/09/2021    Rash    Right upper quadrant pain 12/23/2017    Colicky right upper quadrant pain    Segmental and somatic dysfunction of pelvic region 01/30/2020    Segmental and somatic dysfunction of pelvic region    Sprain of metacarpophalangeal joint of left thumb, initial encounter 03/25/2018    Sprain of metacarpophalangeal (MCP) joint of left thumb, initial encounter    Strain of muscle, fascia and tendon of right hip, initial encounter 05/27/2020    Tear of right gluteus medius tendon    Thyrotoxicosis, unspecified without thyrotoxic crisis or storm  08/25/2022    Subclinical hyperthyroidism    Trochanteric bursitis, right hip 05/27/2020    Trochanteric bursitis of right hip    Unspecified injury of left wrist, hand and finger(s), subsequent encounter 05/08/2018    Injury of left thumb, subsequent encounter    Urinary tract infection, site not specified 08/19/2021    Acute UTI   [2]   Past Surgical History:  Procedure Laterality Date    OTHER SURGICAL HISTORY  10/07/2016    Dental Surgery    OTHER SURGICAL HISTORY  09/20/2019    [place] tooth extraction    OTHER SURGICAL HISTORY  09/20/2019    Varicose vein ligation    OTHER SURGICAL HISTORY  01/11/2014    Vaginal Pap smear    SINUS SURGERY  10/07/2016    Sinus Surgery

## 2025-08-08 ENCOUNTER — APPOINTMENT (OUTPATIENT)
Dept: OBSTETRICS AND GYNECOLOGY | Facility: CLINIC | Age: 40
End: 2025-08-08
Payer: COMMERCIAL

## 2025-08-09 DIAGNOSIS — F90.0 ADHD, PREDOMINANTLY INATTENTIVE TYPE: ICD-10-CM

## 2025-08-09 RX ORDER — DEXTROAMPHETAMINE SULFATE, DEXTROAMPHETAMINE SACCHARATE, AMPHETAMINE SULFATE AND AMPHETAMINE ASPARTATE 5; 5; 5; 5 MG/1; MG/1; MG/1; MG/1
20 CAPSULE, EXTENDED RELEASE ORAL EVERY MORNING
Qty: 30 CAPSULE | Refills: 0 | Status: SHIPPED | OUTPATIENT
Start: 2025-08-09

## 2025-09-10 ENCOUNTER — APPOINTMENT (OUTPATIENT)
Dept: OTOLARYNGOLOGY | Facility: CLINIC | Age: 40
End: 2025-09-10
Payer: COMMERCIAL

## 2026-05-04 ENCOUNTER — APPOINTMENT (OUTPATIENT)
Dept: PRIMARY CARE | Facility: CLINIC | Age: 41
End: 2026-05-04
Payer: COMMERCIAL